# Patient Record
Sex: MALE | Race: WHITE | NOT HISPANIC OR LATINO | Employment: OTHER | URBAN - METROPOLITAN AREA
[De-identification: names, ages, dates, MRNs, and addresses within clinical notes are randomized per-mention and may not be internally consistent; named-entity substitution may affect disease eponyms.]

---

## 2019-10-16 ENCOUNTER — HOSPITAL ENCOUNTER (EMERGENCY)
Facility: CLINIC | Age: 69
Discharge: HOME | End: 2019-10-16
Attending: EMERGENCY MEDICINE | Admitting: EMERGENCY MEDICINE
Payer: MEDICARE

## 2019-10-16 ENCOUNTER — APPOINTMENT (OUTPATIENT)
Dept: CT IMAGING | Facility: CLINIC | Age: 69
End: 2019-10-16
Attending: EMERGENCY MEDICINE
Payer: MEDICARE

## 2019-10-16 VITALS
OXYGEN SATURATION: 98 % | SYSTOLIC BLOOD PRESSURE: 138 MMHG | TEMPERATURE: 98.3 F | HEART RATE: 70 BPM | BODY MASS INDEX: 32.62 KG/M2 | RESPIRATION RATE: 20 BRPM | WEIGHT: 220.24 LBS | HEIGHT: 69 IN | DIASTOLIC BLOOD PRESSURE: 78 MMHG

## 2019-10-16 DIAGNOSIS — R07.89 ATYPICAL CHEST PAIN: ICD-10-CM

## 2019-10-16 DIAGNOSIS — R10.13 ABDOMINAL PAIN, EPIGASTRIC: ICD-10-CM

## 2019-10-16 LAB
ALBUMIN SERPL-MCNC: 3.9 G/DL (ref 3.4–5)
ALBUMIN UR-MCNC: NEGATIVE MG/DL
ALP SERPL-CCNC: 96 U/L (ref 40–150)
ALT SERPL W P-5'-P-CCNC: 32 U/L (ref 0–70)
ANION GAP SERPL CALCULATED.3IONS-SCNC: 2 MMOL/L (ref 3–14)
APPEARANCE UR: CLEAR
AST SERPL W P-5'-P-CCNC: 40 U/L (ref 0–45)
BASOPHILS # BLD AUTO: 0.1 10E9/L (ref 0–0.2)
BASOPHILS NFR BLD AUTO: 1.4 %
BILIRUB DIRECT SERPL-MCNC: <0.1 MG/DL (ref 0–0.2)
BILIRUB SERPL-MCNC: 0.9 MG/DL (ref 0.2–1.3)
BILIRUB UR QL STRIP: NEGATIVE
BUN SERPL-MCNC: 20 MG/DL (ref 7–30)
CALCIUM SERPL-MCNC: 9.1 MG/DL (ref 8.5–10.1)
CAOX CRY #/AREA URNS HPF: ABNORMAL /HPF
CHLORIDE SERPL-SCNC: 105 MMOL/L (ref 94–109)
CO2 SERPL-SCNC: 30 MMOL/L (ref 20–32)
COLOR UR AUTO: YELLOW
CREAT SERPL-MCNC: 0.96 MG/DL (ref 0.66–1.25)
DIFFERENTIAL METHOD BLD: ABNORMAL
EOSINOPHIL # BLD AUTO: 0.2 10E9/L (ref 0–0.7)
EOSINOPHIL NFR BLD AUTO: 4.4 %
ERYTHROCYTE [DISTWIDTH] IN BLOOD BY AUTOMATED COUNT: 11.9 % (ref 10–15)
GFR SERPL CREATININE-BSD FRML MDRD: 79 ML/MIN/{1.73_M2}
GLUCOSE SERPL-MCNC: 145 MG/DL (ref 70–99)
GLUCOSE UR STRIP-MCNC: NEGATIVE MG/DL
HCT VFR BLD AUTO: 41.3 % (ref 40–53)
HGB BLD-MCNC: 13.8 G/DL (ref 13.3–17.7)
HGB UR QL STRIP: NEGATIVE
IMM GRANULOCYTES # BLD: 0 10E9/L (ref 0–0.4)
IMM GRANULOCYTES NFR BLD: 0.2 %
KETONES UR STRIP-MCNC: NEGATIVE MG/DL
LEUKOCYTE ESTERASE UR QL STRIP: NEGATIVE
LYMPHOCYTES # BLD AUTO: 2.3 10E9/L (ref 0.8–5.3)
LYMPHOCYTES NFR BLD AUTO: 53.3 %
MCH RBC QN AUTO: 30.4 PG (ref 26.5–33)
MCHC RBC AUTO-ENTMCNC: 33.4 G/DL (ref 31.5–36.5)
MCV RBC AUTO: 91 FL (ref 78–100)
MONOCYTES # BLD AUTO: 0.4 10E9/L (ref 0–1.3)
MONOCYTES NFR BLD AUTO: 10 %
MUCOUS THREADS #/AREA URNS LPF: PRESENT /LPF
NEUTROPHILS # BLD AUTO: 1.3 10E9/L (ref 1.6–8.3)
NEUTROPHILS NFR BLD AUTO: 30.7 %
NITRATE UR QL: NEGATIVE
NRBC # BLD AUTO: 0 10*3/UL
NRBC BLD AUTO-RTO: 0 /100
PH UR STRIP: 5.5 PH (ref 5–7)
PLATELET # BLD AUTO: 223 10E9/L (ref 150–450)
POTASSIUM SERPL-SCNC: 5 MMOL/L (ref 3.4–5.3)
PROT SERPL-MCNC: 8 G/DL (ref 6.8–8.8)
RBC # BLD AUTO: 4.54 10E12/L (ref 4.4–5.9)
RBC #/AREA URNS AUTO: 2 /HPF (ref 0–2)
SODIUM SERPL-SCNC: 137 MMOL/L (ref 133–144)
SOURCE: ABNORMAL
SP GR UR STRIP: 1.03 (ref 1–1.03)
SQUAMOUS #/AREA URNS AUTO: <1 /HPF (ref 0–1)
TROPONIN I SERPL-MCNC: <0.015 UG/L (ref 0–0.04)
UROBILINOGEN UR STRIP-MCNC: NORMAL MG/DL (ref 0–2)
WBC # BLD AUTO: 4.3 10E9/L (ref 4–11)
WBC #/AREA URNS AUTO: <1 /HPF (ref 0–5)

## 2019-10-16 PROCEDURE — 96361 HYDRATE IV INFUSION ADD-ON: CPT

## 2019-10-16 PROCEDURE — 81001 URINALYSIS AUTO W/SCOPE: CPT | Performed by: EMERGENCY MEDICINE

## 2019-10-16 PROCEDURE — 80048 BASIC METABOLIC PNL TOTAL CA: CPT | Performed by: EMERGENCY MEDICINE

## 2019-10-16 PROCEDURE — 25000128 H RX IP 250 OP 636: Performed by: EMERGENCY MEDICINE

## 2019-10-16 PROCEDURE — 99285 EMERGENCY DEPT VISIT HI MDM: CPT | Mod: 25

## 2019-10-16 PROCEDURE — 84484 ASSAY OF TROPONIN QUANT: CPT | Performed by: EMERGENCY MEDICINE

## 2019-10-16 PROCEDURE — 96360 HYDRATION IV INFUSION INIT: CPT

## 2019-10-16 PROCEDURE — 85025 COMPLETE CBC W/AUTO DIFF WBC: CPT | Performed by: EMERGENCY MEDICINE

## 2019-10-16 PROCEDURE — 74176 CT ABD & PELVIS W/O CONTRAST: CPT

## 2019-10-16 PROCEDURE — 80076 HEPATIC FUNCTION PANEL: CPT | Performed by: EMERGENCY MEDICINE

## 2019-10-16 PROCEDURE — 25000132 ZZH RX MED GY IP 250 OP 250 PS 637: Mod: GY | Performed by: EMERGENCY MEDICINE

## 2019-10-16 RX ORDER — IOPAMIDOL 755 MG/ML
500 INJECTION, SOLUTION INTRAVASCULAR ONCE
Status: DISCONTINUED | OUTPATIENT
Start: 2019-10-16 | End: 2019-10-16

## 2019-10-16 RX ORDER — IBUPROFEN 800 MG/1
800 TABLET, FILM COATED ORAL ONCE
Status: COMPLETED | OUTPATIENT
Start: 2019-10-16 | End: 2019-10-16

## 2019-10-16 RX ORDER — IBUPROFEN 800 MG/1
800 TABLET, FILM COATED ORAL EVERY 8 HOURS PRN
Qty: 15 TABLET | Refills: 0 | Status: ON HOLD | OUTPATIENT
Start: 2019-10-16 | End: 2022-06-19

## 2019-10-16 RX ADMIN — IBUPROFEN 800 MG: 800 TABLET ORAL at 16:52

## 2019-10-16 RX ADMIN — SODIUM CHLORIDE 1000 ML: 9 INJECTION, SOLUTION INTRAVENOUS at 14:18

## 2019-10-16 ASSESSMENT — ENCOUNTER SYMPTOMS
HEMATURIA: 0
DYSURIA: 0
DIAPHORESIS: 1
SHORTNESS OF BREATH: 0
HEADACHES: 1
WEAKNESS: 1
DIARRHEA: 0
ABDOMINAL PAIN: 1
CONSTIPATION: 0
ABDOMINAL DISTENTION: 1
FREQUENCY: 0
NAUSEA: 1

## 2019-10-16 ASSESSMENT — MIFFLIN-ST. JEOR: SCORE: 1744.38

## 2019-10-16 NOTE — DISCHARGE INSTRUCTIONS
Diagnosis: Nonspecific abdominal pain  Plan: Follow-up with your doctor to discuss your chest pain symptoms and Follow-up with [facility] as already recommended by your primary care provider.  [facility]    [address]    [place] [address]    Phone: [phone]    Return Precautions:   Worsening symptoms including chest pain, shortness of breath, abdominal pain, vomiting, or for any other concerns.     Please read the remainder of your discharge instructions for more information.     Discharge Instructions  Chest Pain    You have been seen today for chest pain or discomfort.  At this time, your provider has found no signs that your chest pain is due to a serious or life-threatening condition, (or you have declined more testing and/or admission to the hospital). However, sometimes there is a serious problem that does not show up right away. Your evaluation today may not be complete and you may need further testing and evaluation.     Generally, every Emergency Department visit should have a follow-up clinic visit with either a primary or a specialty clinic/provider. Please follow-up as instructed by your emergency provider today.  Return to the Emergency Department if:  Your chest pain changes, gets worse, starts to happen more often, or comes with less activity.  You are newly short of breath.  You get very weak or tired.  You pass out or faint.  You have any new symptoms, like fever, cough, numb legs, or you cough up blood.  You have anything else that worries you.    Until you follow-up with your regular provider, please do the following:  Take one aspirin daily unless you have an allergy or are told not to by your provider.  If a stress test appointment has been made, go to the appointment.  If you have questions, contact your regular provider.  Follow-up with your regular provider/clinic as directed; this is very important.    If you were given a prescription for  medicine here today, be sure to read all of the information (including the package insert) that comes with your prescription.  This will include important information about the medicine, its side effects, and any warnings that you need to know about.  The pharmacist who fills the prescription can provide more information and answer questions you may have about the medicine.  If you have questions or concerns that the pharmacist cannot address, please call or return to the Emergency Department.       Remember that you can always come back to the Emergency Department if you are not able to see your regular provider in the amount of time listed above, if you get any new symptoms, or if there is anything that worries you.  Discharge Instructions  Abdominal Pain    Abdominal pain (belly pain) can be caused by many things. Your evaluation today does not show the exact cause for your pain. Your provider today has decided that it is unlikely your pain is due to a life threatening problem, or a problem requiring surgery or hospital admission. Sometimes those problems cannot be found right away, so it is very important that you follow up as directed.  Sometimes only the changes which occur over time allow the cause of your pain to be found.    Generally, every Emergency Department visit should have a follow-up clinic visit with either a primary or a specialty clinic/provider. Please follow-up as instructed by your emergency provider today. With abdominal pain, we often recommend very close follow-up, such as the following day.    ADULTS:  Return to the Emergency Department right away if:    You get an oral temperature above 102oF or as directed by your provider.  You have blood in your stools. This may be bright red or appear as black, tarry stools.    You keep vomiting (throwing up) or cannot drink liquids.  You see blood when you vomit.   You cannot have a bowel movement or you cannot pass gas.  Your stomach gets bloated or  bigger.  Your skin or the whites of your eyes look yellow.  You faint.  You have bloody, frequent or painful urination (peeing).  You have new symptoms or anything that worries you.    CHILDREN:  Return to the Emergency Department right away if your child has any of the above-listed symptoms or the following:    Pushes your hand away or screams/cries when his/her belly is touched.  You notice your child is very fussy or weak.  Your child is very tired and is too tired to eat or drink.  Your child is dehydrated.  Signs of dehydration can be:  Significant change in the amount of wet diapers/urine.  Your infant or child starts to have dry mouth and lips, or no saliva (spit) or tears.    PREGNANT WOMEN:  Return to the Emergency Department right away if you have any of the above-listed symptoms or the following:    You have bleeding, leaking fluid or passing tissue from the vagina.  You have worse pain or cramping, or pain in your shoulder or back.  You have vomiting that will not stop.  You have a temperature of 100oF or more.  Your baby is not moving as much as usual.  You faint.  You get a bad headache with or without eye problems and abdominal pain.  You have a seizure.  You have unusual discharge from your vagina and abdominal pain.    Abdominal pain is pretty common during pregnancy.  Your pain may or may not be related to your pregnancy. You should follow-up closely with your OB provider so they can evaluate you and your baby.  Until you follow-up with your regular provider, do the following:     Avoid sex and do not put anything in your vagina.  Drink clear fluids.  Only take medications approved by your provider.    MORE INFORMATION:    Appendicitis:  A possible cause of abdominal pain in any person who still has their appendix is acute appendicitis. Appendicitis is often hard to diagnose.  Testing does not always rule out early appendicitis or other causes of abdominal pain. Close follow-up with your provider  "and re-evaluations may be needed to figure out the reason for your abdominal pain.    Follow-up:  It is very important that you make an appointment with your clinic and go to the appointment.  If you do not follow-up with your primary provider, it may result in missing an important development which could result in permanent injury or disability and/or lasting pain.  If there is any problem keeping your appointment, call your provider or return to the Emergency Department.    Medications:  Take your medications as directed by your provider today.  Before using over-the-counter medications, ask your provider and make sure to take the medications as directed.  If you have any questions about medications, ask your provider.    Diet:  Resume your normal diet as much as possible, but do not eat fried, fatty or spicy foods while you have pain.  Do not drink alcohol or have caffeine.  Do not smoke tobacco.    Probiotics: If you have been given an antibiotic, you may want to also take a probiotic pill or eat yogurt with live cultures. Probiotics have \"good bacteria\" to help your intestines stay healthy. Studies have shown that probiotics help prevent diarrhea (loose stools) and other intestine problems (including C. diff infection) when you take antibiotics. You can buy these without a prescription in the pharmacy section of the store.     If you were given a prescription for medicine here today, be sure to read all of the information (including the package insert) that comes with your prescription.  This will include important information about the medicine, its side effects, and any warnings that you need to know about.  The pharmacist who fills the prescription can provide more information and answer questions you may have about the medicine.  If you have questions or concerns that the pharmacist cannot address, please call or return to the Emergency Department.       Remember that you can always come back to the " Emergency Department if you are not able to see your regular provider in the amount of time listed above, if you get any new symptoms, or if there is anything that worries you.

## 2019-10-16 NOTE — ED PROVIDER NOTES
History     Chief Complaint:  Abdominal Pain & Nausea    HPI   Jean Paul Gordon is a 69 year old male, with a history of diverticulitis, who presents to the ED for evaluation of generalized abdominal pain and nausea. The patient reports he has been having intermittent abdominal pain and nausea in the morning for some time, estimated to be years. His pain usually subsides after 2-3 hours. He noticed abdominal bloating last week. The patient notes his symptoms worsened over the weekend. He had associated headache with subjective fevers and diaphoresis. He as well currently feels dehydrated and generally weak. The patient denies any dysuria, hematuria, urinary frequency, constipation, or diarrhea. Patient later endoses chest pain which he reports he gets once every 2 weeks, last experienced it yesterday morning.     Allergies:  Contrast dye     Medications:    Clonazepam  Lisinopril   Methadone    Past Medical History:    Anxiety   Myocarditis  Narcolepsy   Diverticulitis   HTN    Past Surgical History:    History reviewed. No pertinent surgical history.    Family History:    History reviewed. No pertinent family history.     Social History:  Smoking status: Never smoker    Alcohol use: No  Presents to ED alone    Marital Status:   [2]     Review of Systems   Constitutional: Positive for diaphoresis.   Respiratory: Negative for shortness of breath.    Cardiovascular: Positive for chest pain.   Gastrointestinal: Positive for abdominal distention, abdominal pain and nausea. Negative for constipation and diarrhea.   Genitourinary: Negative for dysuria, frequency and hematuria.   Neurological: Positive for weakness and headaches.   All other systems reviewed and are negative.    Physical Exam     Patient Vitals for the past 24 hrs:   BP Temp Temp src Pulse Heart Rate Resp SpO2 Height Weight   10/16/19 1445 (!) 140/76 -- -- 67 -- -- 97 % -- --   10/16/19 1155 (!) 141/84 98.3  F (36.8  C) Temporal -- 87 20 99 %  "1.753 m (5' 9\") 99.9 kg (220 lb 3.8 oz)     Physical Exam  Nursing note and vitals reviewed.  Constitutional: Cooperative.   HENT:   Mouth/Throat: Moist mucous membranes.   Eyes: EOMI, nonicteric sclera  Cardiovascular: Normal rate, regular rhythm, no murmurs, rubs, or gallops  Pulmonary/Chest: Effort normal and breath sounds normal. No respiratory distress. No wheezes. No rales.   Abdominal: Soft. Nontender, mild distension, no guarding or rigidity. BS present.   Musculoskeletal: Normal range of motion.   Neurological: Alert. Moves all extremities spontaneously.   Skin: Skin is warm and dry. No rash noted.   Psychiatric: Normal mood and affect.       Emergency Department Course     ECG (16:12:32):  Rate 64 bpm. CA interval 166. QRS duration 82. QT/QTc 452/466. P-R-T axes 34 -13 52. Normal sinus rhythm. Normal ECG. No significant change compared to EKG dated 11/2/15. Interpreted at 1614 by Jean Paul Gomez MD.    Imaging:  Radiographic findings were communicated with the patient who voiced understanding of the findings.    CT Abdomen Pelvis w/o Contrast  IMPRESSION:   1. No acute abnormality in the abdomen or pelvis. No cause for  abdominal pain and distention is identified.  2. Nonobstructing 0.2 cm stone in the lower pole of the right kidney.  Imaging independently reviewed and agree with radiologist interpretation.     Laboratory:  Laboratory findings were communicated with the patient who voiced understanding of the findings.    Troponin I (1257): <0.015    Hepatic panel: WNL     CBC: o/w WNL (WBC 4.3, HGB 13.8, )  BMP: Anion gap 2(L), Glucose 145(H), o/w WNL (Creatinine 0.96)     UA: Mucous present, Calcium oxalate few, o/w Negative     Interventions:  1418: NS 1L Bolus IV    Emergency Department Course:  1200: Patient provided a urine sample.     1257: IV inserted and blood drawn.    Past medical records, nursing notes, and vitals reviewed.  1329: I performed an exam of the patient and obtained " history, as documented above.    The patient was sent for an abdomen pelvis CT while in the emergency department, findings above.    1545: I rechecked the patient. Explained findings to patient. Patient reports having chest pain. Troponin ordered.     1612: EKG obtained.     I rechecked the patient. Explained findings to patient.    Findings and plan explained to the Patient. Patient discharged home with instructions regarding supportive care, medications, and reasons to return. The importance of close follow-up was reviewed. The patient was prescribed Ibuprofen.    Impression & Plan      Medical Decision Making:  Patient presents with multiple complaints, initially focusing on his upper abdominal pain that he has been experiencing for several years.  He reportedly trialed antacids for this without benefit.  This is been worse in the last week prompting presentation.  Patient states it feels like a prior episode of diverticulitis.  Given chronicity of pain, CT indicated to evaluate for malignancy, ascites, among many other etiologies.  CT fortunately negative.  Labs are overall unremarkable.  When I was discharging patient, he reported that he has been having chest pain also over a long period of time.  He has not discussed this with his doctor.  An EKG is nonischemic and troponin is negative.  Given the chronicity of his complaints, I believe this is sufficient to rule out ACS.  He may need a stress test, but I recommended that he discuss this with his primary care physician.  Concerning his abdominal pain, patient has been recommended to follow-up with gastroenterology in the past, but has yet to make an appointment.  I encouraged him to make this appointment as he may need endoscopy for further evaluation of his chronic abdominal pain.  Uncertain of the etiology of his complaints, but no emergent etiology is found.  He is safe/stable for discharge home.  All of his questions were answered.    Diagnosis:     ICD-10-CM   1. Abdominal pain, epigastric R10.13   2. Atypical chest pain R07.89     Disposition: Patient discharged to home     Discharge Medications:  New Prescriptions    IBUPROFEN (ADVIL/MOTRIN) 800 MG TABLET    Take 1 tablet (800 mg) by mouth every 8 hours as needed for moderate pain     Мария Centeno  10/16/2019   [facility] EMERGENCY DEPARTMENT    Scribe Disclosure:  I, Мария Centeno, am serving as a scribe at 1:29 PM on 10/16/2019 to document services personally performed by Jean Paul Gomez MD based on my observations and the provider's statements to me.        Jean Paul Gomez MD  10/16/19 1924

## 2019-10-16 NOTE — ED AVS SNAPSHOT
[facility] Emergency Department  [address]  [facility] [address]  Phone:  [phone]  Fax:  [phone]                                    Jean Paul Gordon   MRN: [id number]    Department:  [facility] Emergency Department   Date of Visit:  10/16/2019           After Visit Summary Signature Page    I have received my discharge instructions, and my questions have been answered. I have discussed any challenges I see with this plan with the nurse or doctor.    ..........................................................................................................................................  Patient/Patient Representative Signature      ..........................................................................................................................................  Patient Representative Print Name and Relationship to Patient    ..................................................               ................................................  Date                                   Time    ..........................................................................................................................................  Reviewed by Signature/Title    ...................................................              ..............................................  Date                                               Time          22EPIC Rev 08/18

## 2019-10-16 NOTE — ED TRIAGE NOTES
"Pt has generalized abdominal pain for \"a long time\".  He takes pain medication and pain goes away in 2-3 hours.  He has nausea and reports losing interest in activities and feels \"blah\".  The medication that he takes \"is not working anymore\" for narcolepsy.  He has generalized weakness.  "

## 2019-10-17 LAB — INTERPRETATION ECG - MUSE: NORMAL

## 2022-01-19 ENCOUNTER — NURSE TRIAGE (OUTPATIENT)
Dept: NURSING | Facility: CLINIC | Age: 72
End: 2022-01-19
Payer: MEDICARE

## 2022-01-19 NOTE — TELEPHONE ENCOUNTER
Caller state that he has been advised by his PCP to go to the ED   He is contacting  [facility] with questions regarding possible available treatment   Caller is informed to proceed to ED and  They will determine treatments available for his Covid sympoms   Caller understands and will proceed   Devi Lawton RN  FNA       Additional Information    Information only question and nurse able to answer    Protocols used: INFORMATION ONLY CALL - NO TRIAGE-A-OH

## 2022-01-20 ENCOUNTER — APPOINTMENT (OUTPATIENT)
Dept: ULTRASOUND IMAGING | Facility: CLINIC | Age: 72
End: 2022-01-20
Attending: PHYSICIAN ASSISTANT
Payer: COMMERCIAL

## 2022-01-20 ENCOUNTER — APPOINTMENT (OUTPATIENT)
Dept: GENERAL RADIOLOGY | Facility: CLINIC | Age: 72
End: 2022-01-20
Attending: PHYSICIAN ASSISTANT
Payer: COMMERCIAL

## 2022-01-20 ENCOUNTER — APPOINTMENT (OUTPATIENT)
Dept: NUCLEAR MEDICINE | Facility: CLINIC | Age: 72
End: 2022-01-20
Attending: PHYSICIAN ASSISTANT
Payer: COMMERCIAL

## 2022-01-20 ENCOUNTER — OFFICE VISIT (OUTPATIENT)
Dept: URGENT CARE | Facility: URGENT CARE | Age: 72
End: 2022-01-20
Payer: COMMERCIAL

## 2022-01-20 ENCOUNTER — HOSPITAL ENCOUNTER (EMERGENCY)
Facility: CLINIC | Age: 72
Discharge: HOME | End: 2022-01-20
Attending: PHYSICIAN ASSISTANT | Admitting: PHYSICIAN ASSISTANT
Payer: COMMERCIAL

## 2022-01-20 VITALS
TEMPERATURE: 98.2 F | SYSTOLIC BLOOD PRESSURE: 164 MMHG | DIASTOLIC BLOOD PRESSURE: 90 MMHG | RESPIRATION RATE: 16 BRPM | HEART RATE: 73 BPM | OXYGEN SATURATION: 95 %

## 2022-01-20 VITALS
OXYGEN SATURATION: 87 % | DIASTOLIC BLOOD PRESSURE: 100 MMHG | SYSTOLIC BLOOD PRESSURE: 152 MMHG | RESPIRATION RATE: 22 BRPM | TEMPERATURE: 98.9 F | HEART RATE: 89 BPM

## 2022-01-20 DIAGNOSIS — U07.1 PNEUMONIA DUE TO 2019 NOVEL CORONAVIRUS: ICD-10-CM

## 2022-01-20 DIAGNOSIS — U07.1 INFECTION DUE TO 2019 NOVEL CORONAVIRUS: Primary | ICD-10-CM

## 2022-01-20 DIAGNOSIS — R09.02 HYPOXIA: ICD-10-CM

## 2022-01-20 DIAGNOSIS — J12.82 PNEUMONIA DUE TO 2019 NOVEL CORONAVIRUS: ICD-10-CM

## 2022-01-20 LAB
ANION GAP SERPL CALCULATED.3IONS-SCNC: 6 MMOL/L (ref 3–14)
BASOPHILS # BLD AUTO: 0 10E3/UL (ref 0–0.2)
BASOPHILS NFR BLD AUTO: 0 %
BUN SERPL-MCNC: 19 MG/DL (ref 7–30)
CALCIUM SERPL-MCNC: 8.9 MG/DL (ref 8.5–10.1)
CHLORIDE BLD-SCNC: 105 MMOL/L (ref 94–109)
CO2 SERPL-SCNC: 27 MMOL/L (ref 20–32)
CREAT SERPL-MCNC: 0.7 MG/DL (ref 0.66–1.25)
D DIMER PPP FEU-MCNC: 12.81 UG/ML FEU (ref 0–0.5)
EOSINOPHIL # BLD AUTO: 0 10E3/UL (ref 0–0.7)
EOSINOPHIL NFR BLD AUTO: 0 %
ERYTHROCYTE [DISTWIDTH] IN BLOOD BY AUTOMATED COUNT: 11.7 % (ref 10–15)
GFR SERPL CREATININE-BSD FRML MDRD: >90 ML/MIN/1.73M2
GLUCOSE BLD-MCNC: 105 MG/DL (ref 70–99)
HCT VFR BLD AUTO: 36 % (ref 40–53)
HGB BLD-MCNC: 11.6 G/DL (ref 13.3–17.7)
IMM GRANULOCYTES # BLD: 0.3 10E3/UL
IMM GRANULOCYTES NFR BLD: 3 %
LYMPHOCYTES # BLD AUTO: 0.6 10E3/UL (ref 0.8–5.3)
LYMPHOCYTES NFR BLD AUTO: 7 %
MCH RBC QN AUTO: 29 PG (ref 26.5–33)
MCHC RBC AUTO-ENTMCNC: 32.2 G/DL (ref 31.5–36.5)
MCV RBC AUTO: 90 FL (ref 78–100)
MONOCYTES # BLD AUTO: 0.4 10E3/UL (ref 0–1.3)
MONOCYTES NFR BLD AUTO: 4 %
NEUTROPHILS # BLD AUTO: 7.2 10E3/UL (ref 1.6–8.3)
NEUTROPHILS NFR BLD AUTO: 86 %
NRBC # BLD AUTO: 0 10E3/UL
NRBC BLD AUTO-RTO: 0 /100
NT-PROBNP SERPL-MCNC: 735 PG/ML (ref 0–900)
PLATELET # BLD AUTO: 244 10E3/UL (ref 150–450)
POTASSIUM BLD-SCNC: 4 MMOL/L (ref 3.4–5.3)
RBC # BLD AUTO: 4 10E6/UL (ref 4.4–5.9)
SODIUM SERPL-SCNC: 138 MMOL/L (ref 133–144)
TROPONIN I SERPL HS-MCNC: 5 NG/L
WBC # BLD AUTO: 8.5 10E3/UL (ref 4–11)

## 2022-01-20 PROCEDURE — 36415 COLL VENOUS BLD VENIPUNCTURE: CPT | Performed by: PHYSICIAN ASSISTANT

## 2022-01-20 PROCEDURE — 78580 LUNG PERFUSION IMAGING: CPT

## 2022-01-20 PROCEDURE — 83880 ASSAY OF NATRIURETIC PEPTIDE: CPT | Performed by: PHYSICIAN ASSISTANT

## 2022-01-20 PROCEDURE — 93970 EXTREMITY STUDY: CPT

## 2022-01-20 PROCEDURE — 99285 EMERGENCY DEPT VISIT HI MDM: CPT | Mod: 25

## 2022-01-20 PROCEDURE — 84484 ASSAY OF TROPONIN QUANT: CPT | Performed by: EMERGENCY MEDICINE

## 2022-01-20 PROCEDURE — 71045 X-RAY EXAM CHEST 1 VIEW: CPT

## 2022-01-20 PROCEDURE — 99203 OFFICE O/P NEW LOW 30 MIN: CPT | Performed by: FAMILY MEDICINE

## 2022-01-20 PROCEDURE — 93005 ELECTROCARDIOGRAM TRACING: CPT

## 2022-01-20 PROCEDURE — 250N000011 HC RX IP 250 OP 636: Performed by: PHYSICIAN ASSISTANT

## 2022-01-20 PROCEDURE — 343N000001 HC RX 343: Performed by: EMERGENCY MEDICINE

## 2022-01-20 PROCEDURE — 80048 BASIC METABOLIC PNL TOTAL CA: CPT | Performed by: PHYSICIAN ASSISTANT

## 2022-01-20 PROCEDURE — A9540 TC99M MAA: HCPCS | Performed by: EMERGENCY MEDICINE

## 2022-01-20 PROCEDURE — 85379 FIBRIN DEGRADATION QUANT: CPT | Performed by: PHYSICIAN ASSISTANT

## 2022-01-20 PROCEDURE — 96374 THER/PROPH/DIAG INJ IV PUSH: CPT | Mod: 59

## 2022-01-20 PROCEDURE — 85025 COMPLETE CBC W/AUTO DIFF WBC: CPT | Performed by: EMERGENCY MEDICINE

## 2022-01-20 RX ORDER — DEXTROAMPHETAMINE SULFATE 10 MG/1
TABLET ORAL
COMMUNITY
Start: 2021-10-07

## 2022-01-20 RX ORDER — TESTOSTERONE 2 MG/D
PATCH TRANSDERMAL
Status: ON HOLD | COMMUNITY
Start: 2021-09-16 | End: 2022-06-19

## 2022-01-20 RX ORDER — HYDROCHLOROTHIAZIDE 12.5 MG/1
1 TABLET ORAL DAILY
COMMUNITY
Start: 2021-03-10

## 2022-01-20 RX ORDER — PREDNISONE 20 MG/1
TABLET ORAL
Status: ON HOLD | COMMUNITY
Start: 2022-01-19 | End: 2022-06-19

## 2022-01-20 RX ORDER — ALBUTEROL SULFATE 90 UG/1
1-2 AEROSOL, METERED RESPIRATORY (INHALATION)
Status: ON HOLD | COMMUNITY
Start: 2022-01-12 | End: 2022-06-19

## 2022-01-20 RX ORDER — DOXYCYCLINE HYCLATE 100 MG
TABLET ORAL
Status: ON HOLD | COMMUNITY
Start: 2022-01-12 | End: 2022-06-19

## 2022-01-20 RX ORDER — LORAZEPAM 2 MG/ML
0.5 INJECTION INTRAMUSCULAR ONCE
Status: COMPLETED | OUTPATIENT
Start: 2022-01-20 | End: 2022-01-20

## 2022-01-20 RX ORDER — ATORVASTATIN CALCIUM 10 MG/1
10 TABLET, FILM COATED ORAL DAILY
COMMUNITY
Start: 2021-09-16

## 2022-01-20 RX ORDER — LISINOPRIL 20 MG/1
20 TABLET ORAL DAILY
COMMUNITY
Start: 2021-09-16

## 2022-01-20 RX ADMIN — LORAZEPAM 0.5 MG: 2 INJECTION INTRAMUSCULAR; INTRAVENOUS at 19:38

## 2022-01-20 RX ADMIN — KIT FOR THE PREPARATION OF TECHNETIUM TC 99M ALBUMIN AGGREGATED 3.4 MILLICURIE: 2.5 INJECTION, POWDER, FOR SOLUTION INTRAVENOUS at 20:15

## 2022-01-20 ASSESSMENT — ENCOUNTER SYMPTOMS
SHORTNESS OF BREATH: 1
COUGH: 1
FEVER: 1

## 2022-01-20 NOTE — ED TRIAGE NOTES
Patient presents from urgent care COVID+ with concerns for hypoxia and rule out PE. Tested COVID positive 1/8/22. Has been treated at home with some home 02, nebs, prednisone and doxycycline. Medications helped some. Went to urgent care today due to SOB and was sent here due to a oxygen reading of 87%. In triage, patient 97% while speaking.

## 2022-01-20 NOTE — ED NOTES
"Patient observed to be walking around ER lobby talking loudly on cell phone. Pt not displaying signs of shortness of breath. Pt maintaining steady gait while walking and talking. Pt overheard to mention many president's names and stated numerous times \"If so and so was here they would've given the meds to everyone\".   "

## 2022-01-20 NOTE — PROGRESS NOTES
SUBJECTIVE:   Jean Paul Gordon is a 71 year old male presenting with a chief complaint of SOB, COVID infection.    No know positive COVID exposure, patient reports that has completed COVID vaccinations and also received his booster already.    Had cold symptoms and fatigue initially 1/7, went to [facility] on 1/8 - diagnosed with COVID infection.  Per patient, was given prednisone and doxycycline, nebulizer and ended up getting Oxygen treatment delivered to his home  Over this week has been progressively worsening, declined ER evaluation when contacted PCP.    Patient reports that a PA came out to his home yesterday to evaluate and told patient that has slight wheeze.  Patient reports that is using oxygen overnight and will get level up, did comment that was told that 91-92 was still okay by PA.    Here today in UC and requesting new treatment that he has heard on the news for COVID infection as he does not want to go to ER.    Past Medical History:   Diagnosis Date     Anxiety state, unspecified      Hypertension      Myocarditis (H)      Narcolepsy      Current Outpatient Medications   Medication Sig Dispense Refill     albuterol (PROAIR HFA/PROVENTIL HFA/VENTOLIN HFA) 108 (90 Base) MCG/ACT inhaler Inhale 1-2 puffs into the lungs       atorvastatin (LIPITOR) 10 MG tablet Take 10 mg by mouth daily       dextroamphetamine (DEXTROSTAT) 10 MG tablet Take one tablet Four times a day       hydrochlorothiazide (HYDRODIURIL) 12.5 MG tablet Take 1 tablet by mouth daily       lisinopril (ZESTRIL) 20 MG tablet Take 20 mg by mouth       NONFORMULARY oxygen-air delivery systems (HOME OXYGEN)       testosterone (ANDRODERM) 2 MG/24HR 24 hr patch Apply patch daily to clean skin on back, abdomen, arms, thighs. Not to genitals. Remove old patch before applying new on       vitamin D3 (CHOLECALCIFEROL) 1.25 MG (38738 UT) capsule Take 50,000 Units by mouth       ClonazePAM (KLONOPIN PO)        doxycycline hyclate (VIBRA-TABS) 100  "MG tablet        ibuprofen (ADVIL/MOTRIN) 800 MG tablet Take 1 tablet (800 mg) by mouth every 8 hours as needed for moderate pain 15 tablet 0     LISINOPRIL PO        METHADONE HCL PO        predniSONE (DELTASONE) 20 MG tablet        UNABLE TO FIND \"Dexastat\"       Social History     Tobacco Use     Smoking status: Never Smoker     Smokeless tobacco: Not on file   Substance Use Topics     Alcohol use: No       ROS:  Review of systems negative except as stated above.    OBJECTIVE:  BP (!) 152/100   Pulse 89   Temp 98.9  F (37.2  C)   Resp 22   SpO2 (!) 87%   GENERAL APPEARANCE: healthy, alert, fatigue  EYES: EOMI,  PERRL, conjunctiva clear  RESP: lungs coarse, no crackles or wheezes, dyspneic  PSYCH: mentation appears normal and anxious    ASSESSMENT/PLAN:(U07.1) Infection due to 2019 novel coronavirus  (primary encounter diagnosis)  Plan: to ER    (R09.02) Hypoxia  Plan: to ER  '    Oxygen via NC - 2L given with improvement of oxygen to 99%.    Patient with COVID infection, progressively worsening symptoms and with current hypoxia and SOB, reviewed that is critical that needs further evaluation in ER.  Unclear in regards to his understanding of oxygen use at home but discussed that with hypoxia that further tests to assess for possible PE vs worsening COVID vs cardiac etiology is needed at this time.  Urged ER evaluation and patient did finally relent, wife will transport via private car to [facility] ER, ER notified    Yunior Ramírez MD  January 20, 2022 3:37 PM          "

## 2022-01-20 NOTE — ED PROVIDER NOTES
History   Chief Complaint:  Covid Concern       The history is provided by the patient.      Jean Paul Gordon is a 71 year old male with history of hypertension and myocarditis who presents with a COVID-19 concern. The patient tested positive for COVID-19 on 1/8/22 and has persisting symptoms of a cough, shortness of breath, and fatigue. He was referred to the ED from urgent care after they measured his oxygen levels to be 87%. In triage his oxygen was measured to be 97% while speaking. He reports self-treating at home with home oxygen, nebulizer, prednisone, and doxycycline. He states he is curious about new COVID-19 antivirals and malaria medicine. He states that the antibiotic he got in urgent care did not help his symptoms.  He notes some aches in his body including his legs.  He denies chest pain.  He does not necessarily feel that his shortness of breath is getting worse but it is not getting any better.  He states he is vaccinated.  He is not having any more fevers.  He notes no leg swelling or weight changes.    Review of Systems   Constitutional: Positive for fever.   Respiratory: Positive for cough and shortness of breath.    All other systems reviewed and are negative.      Allergies:  Methylphenidate  Armodafinil  Contrast dye    Medications:  Albuterol  Lipitor  Klonopin  Dextrostat  Vibra-tabs  Hydrodiuril  Zestril  Lisinopril  Methadone  Deltasone  Androderm  Home oxygen    Past Medical History:     Anxiety  Hypertension  Myocarditis  Narcolepsy  Opioid dependence  Hypogonadism  Recurrent erosion of cornea  Osteoarthritis  TMJ arthritis  Episodic cluster headache    Family History:    Father: bladder cancer, diabetes  Mother: bladder cancer    Social History:  Presents unaccompanied  PCP: Jo Sahni    Physical Exam     Patient Vitals for the past 24 hrs:   BP Temp Temp src Pulse Resp SpO2   01/20/22 1901 -- -- -- -- -- 92 %   01/20/22 1840 (!) 161/106 -- -- 76 -- 98 %   01/20/22 1839 --  -- -- -- -- 98 %   01/20/22 1824 -- -- -- -- -- 96 %   01/20/22 1823 -- -- -- -- -- 96 %   01/20/22 1822 -- -- -- -- -- 96 %   01/20/22 1820 (!) 159/94 -- -- -- -- 97 %   01/20/22 1819 (!) 159/94 -- -- 69 -- 97 %   01/20/22 1800 -- -- -- -- -- 97 %   01/20/22 1554 (!) 160/91 98.2  F (36.8  C) Oral 89 20 98 %       Physical Exam  General: Awake, alert, pleasant, non-toxic. Anxious appearing.  Head:  Scalp is NC/AT  Eyes:  Conjunctiva normal, PERRL  ENT:  The external nose and ears are normal.   Neck:  Normal range of motion without rigidity.  CV:  Regular rate and rhythm    No pathologic murmur, rubs, or gallops.  Resp:  Bilateral crackles.  No wheezes.    Non-labored, no retractions or accessory muscle use  Abdomen: Abdomen is soft, no distension, no tenderness, no masses. No CVA tenderness.  MS:  No lower extremity edema or asymmetric calf swelling.     No midline cervical, thoracic, or lumbar tenderness  Skin:  Warm and dry, No rash or lesions noted. 2+ peripheral pulses in all extremities  Neuro: Alert and oriented x3.  GCS 15 No gross motor deficits.  No facial asymmetry.  Psych: Awake. Alert. Normal affect. Appropriate interactions.    Emergency Department Course   ECG  ECG obtained at 1806, ECG read at 1809  Normal sinus rhythm. Moderate voltage criteria for LVH, may be normal variant. Prolonged QT. Abnormal ECG.   No significant change as compared to prior, dated 10/16/19.  Rate 70 bpm. DE interval 140 ms. QRS duration 86 ms. QT/QTc 450/486 ms. P-R-T axes 27 -9 22.     Imaging:  XR Chest Port 1 View   Final Result   IMPRESSION: Single AP view of the chest was obtained. Mild enlargement of the cardiac silhouette. Bilateral basilar and subpleural predominant patchy pulmonary opacities, worrisome for infection including atypical infection like COVID 19. No significant    pleural effusion or pneumothorax.      Lung vent and perf (NM)    (Results Pending)   US Lower Extremity Venous Duplex Bilateral    (Results  Pending)     Report per radiology    Laboratory:  Labs Ordered and Resulted from Time of ED Arrival to Time of ED Departure   BASIC METABOLIC PANEL - Abnormal       Result Value    Sodium 138      Potassium 4.0      Chloride 105      Carbon Dioxide (CO2) 27      Anion Gap 6      Urea Nitrogen 19      Creatinine 0.70      Calcium 8.9      Glucose 105 (*)     GFR Estimate >90     D DIMER QUANTITATIVE - Abnormal    D-Dimer Quantitative 12.81 (*)    CBC WITH PLATELETS AND DIFFERENTIAL - Abnormal    WBC Count 8.5      RBC Count 4.00 (*)     Hemoglobin 11.6 (*)     Hematocrit 36.0 (*)     MCV 90      MCH 29.0      MCHC 32.2      RDW 11.7      Platelet Count 244      % Neutrophils 86      % Lymphocytes 7      % Monocytes 4      % Eosinophils 0      % Basophils 0      % Immature Granulocytes 3      NRBCs per 100 WBC 0      Absolute Neutrophils 7.2      Absolute Lymphocytes 0.6 (*)     Absolute Monocytes 0.4      Absolute Eosinophils 0.0      Absolute Basophils 0.0      Absolute Immature Granulocytes 0.3      Absolute NRBCs 0.0     NT PROBNP INPATIENT - Normal    N terminal Pro BNP Inpatient 735     TROPONIN I      Emergency Department Course:    Reviewed:  I reviewed nursing notes, vitals, past medical history and Care Everywhere    Assessments:   I obtained history and examined the patient as noted above.    I rechecked the patient and explained findings.     Disposition:  Care of the patient was transferred to my colleague Dr. Basurto pending nuclear lung profusion scan.     Impression & Plan     Medical Decision Makin-year-old male diagnosed with COVID on  who presents with persistent shortness of breath and reported hypoxia at urgent care.  He was referred here for further evaluation for pulmonary embolism and possible admission.  Here his oxygen saturations have been normal.  He is not in respiratory distress.  His x-ray shows findings consistent with COVID-pneumonia no evidence to suggest coexisting  bacterial pneumonia.  He is afebrile with normal white blood cell count.  His EKG shows mildly prolonged QT with LVH but no acute ischemic changes and he is not having chest pain and I think ACS is unlikely.  BNP is not significantly elevated and no evidence of CHF on exam or imaging.  Troponin pending.  D-dimer was markedly elevated however unfortunately he has a contrast allergy.  VQ scan and bilateral lower extremity ultrasound ordered and pending.  He is not a candidate for COVID oral antivirals or monoclonal antibodies given that his symptoms have been going on for 12 days already.  He was signed out to Dr. Basurto  pending remaining test results.    Diagnosis:    ICD-10-CM    1. Pneumonia due to 2019 novel coronavirus  U07.1     J12.82        Discharge Medications:  New Prescriptions    No medications on file       Scribe Disclosure:  I, Danya Echeverria, am serving as a scribe at 5:53 PM on 1/20/2022 to document services personally performed by Figueroa Lipscomb, * based on my observations and the provider's statements to me.            Figueroa Lipscomb PA-C  01/20/22 1943       Figueroa Lipscomb PA-C  01/20/22 1946

## 2022-01-21 LAB
ATRIAL RATE - MUSE: 70 BPM
DIASTOLIC BLOOD PRESSURE - MUSE: NORMAL MMHG
INTERPRETATION ECG - MUSE: NORMAL
P AXIS - MUSE: 27 DEGREES
PR INTERVAL - MUSE: 140 MS
QRS DURATION - MUSE: 86 MS
QT - MUSE: 450 MS
QTC - MUSE: 486 MS
R AXIS - MUSE: -9 DEGREES
SYSTOLIC BLOOD PRESSURE - MUSE: NORMAL MMHG
T AXIS - MUSE: 22 DEGREES
VENTRICULAR RATE- MUSE: 70 BPM

## 2022-01-21 NOTE — ED PROVIDER NOTES
Emergency Department Attending Supervision Note  1/20/2022  8:47 PM      I evaluated this patient in conjunction with Figueroa RUIZ      Briefly, the patient presented with shortness of breath active COVID infection, too far out for Paxlovid or any other intervention at this point. Had questionable hypoxia earlier today, we have not documented any hypoxia during his time in the ED. He is breathing comfortably not requiring any supplemental oxygen. Had a very elevated D-dimer so initiated work-up for pulmonary embolism.      On my exam,       Gen: Resting comfortably   Eyes: Normal conjunctiva, No discharge  CV: regular rate  Resp: speaking in full sentences without any resp distress  Skin: warm dry well perfused  Neuro: Alert, no gross motor or sensory deficits,   Psych: pleasant, affect appropriate        Results:  Labs Ordered and Resulted from Time of ED Arrival to Time of ED Departure   BASIC METABOLIC PANEL - Abnormal       Result Value    Sodium 138      Potassium 4.0      Chloride 105      Carbon Dioxide (CO2) 27      Anion Gap 6      Urea Nitrogen 19      Creatinine 0.70      Calcium 8.9      Glucose 105 (*)     GFR Estimate >90     D DIMER QUANTITATIVE - Abnormal    D-Dimer Quantitative 12.81 (*)    CBC WITH PLATELETS AND DIFFERENTIAL - Abnormal    WBC Count 8.5      RBC Count 4.00 (*)     Hemoglobin 11.6 (*)     Hematocrit 36.0 (*)     MCV 90      MCH 29.0      MCHC 32.2      RDW 11.7      Platelet Count 244      % Neutrophils 86      % Lymphocytes 7      % Monocytes 4      % Eosinophils 0      % Basophils 0      % Immature Granulocytes 3      NRBCs per 100 WBC 0      Absolute Neutrophils 7.2      Absolute Lymphocytes 0.6 (*)     Absolute Monocytes 0.4      Absolute Eosinophils 0.0      Absolute Basophils 0.0      Absolute Immature Granulocytes 0.3      Absolute NRBCs 0.0     NT PROBNP INPATIENT - Normal    N terminal Pro BNP Inpatient 735     TROPONIN I - Normal    Troponin I High Sensitivity 5          NM Lung Scan Perfusion Particulate   Final Result   IMPRESSION:    Nondiagnostic with regards to pulmonary emboli      US Lower Extremity Venous Duplex Bilateral   Final Result   IMPRESSION:   1.  No deep venous thrombosis in the bilateral lower extremities.      XR Chest Port 1 View   Final Result   IMPRESSION: Single AP view of the chest was obtained. Mild enlargement of the cardiac silhouette. Bilateral basilar and subpleural predominant patchy pulmonary opacities, worrisome for infection including atypical infection like COVID 19. No significant    pleural effusion or pneumothorax.          Medications   LORazepam (ATIVAN) injection 0.5 mg (0.5 mg Intravenous Given 1/20/22 1938)   technetium pertechnetate with albumin (Tc99m MAA) radioisotope injection 3 millicurie (3.4 millicuries Intravenous Given 1/20/22 2015)       ED course:    My impression is patient has active COVID-19 infection. We will m  amarilis sure he has an oximeter at home to continue to monitor his symptoms. He has not been hypoxic in the ED not quiring oxygen ambulatory on his own. He is negative for bilateral lower extremity DVT, VQ scan not strongly suggestive of pulmonary embolism. For these reasons I think outpatient management is appropriate. Patient does have oximeter and so he will continue to monitor his O2 sats, if he has any other concerned about them being low I encouraged him to return for recheck at any time. He is comfortable with plan.        Diagnosis    ICD-10-CM    1. Pneumonia due to 2019 novel coronavirus  U07.1     J12.82          Danial Basurto,*        Danial Basurto MD  01/20/22 2050

## 2022-06-18 ENCOUNTER — HOSPITAL ENCOUNTER (OUTPATIENT)
Facility: CLINIC | Age: 72
Setting detail: OBSERVATION
Discharge: HOME | End: 2022-06-19
Attending: EMERGENCY MEDICINE | Admitting: STUDENT IN AN ORGANIZED HEALTH CARE EDUCATION/TRAINING PROGRAM
Payer: MEDICARE

## 2022-06-18 DIAGNOSIS — N17.9 AKI (ACUTE KIDNEY INJURY) (H): ICD-10-CM

## 2022-06-18 DIAGNOSIS — N20.0 NEPHROLITHIASIS: Primary | ICD-10-CM

## 2022-06-18 DIAGNOSIS — N23 RENAL COLIC: ICD-10-CM

## 2022-06-18 PROCEDURE — G0378 HOSPITAL OBSERVATION PER HR: HCPCS

## 2022-06-18 PROCEDURE — 99285 EMERGENCY DEPT VISIT HI MDM: CPT | Mod: 25

## 2022-06-18 PROCEDURE — 96374 THER/PROPH/DIAG INJ IV PUSH: CPT

## 2022-06-18 PROCEDURE — 99220 PR INITIAL OBSERVATION CARE,LEVEL III: CPT | Performed by: STUDENT IN AN ORGANIZED HEALTH CARE EDUCATION/TRAINING PROGRAM

## 2022-06-18 PROCEDURE — 96376 TX/PRO/DX INJ SAME DRUG ADON: CPT

## 2022-06-18 PROCEDURE — 250N000013 HC RX MED GY IP 250 OP 250 PS 637: Performed by: EMERGENCY MEDICINE

## 2022-06-18 PROCEDURE — 250N000011 HC RX IP 250 OP 636: Performed by: EMERGENCY MEDICINE

## 2022-06-18 RX ORDER — SODIUM CHLORIDE 9 MG/ML
INJECTION, SOLUTION INTRAVENOUS CONTINUOUS
Status: DISCONTINUED | OUTPATIENT
Start: 2022-06-18 | End: 2022-06-20 | Stop reason: HOSPADM

## 2022-06-18 RX ORDER — HYDROMORPHONE HYDROCHLORIDE 1 MG/ML
0.5 INJECTION, SOLUTION INTRAMUSCULAR; INTRAVENOUS; SUBCUTANEOUS
Status: DISCONTINUED | OUTPATIENT
Start: 2022-06-18 | End: 2022-06-19

## 2022-06-18 RX ORDER — TAMSULOSIN HYDROCHLORIDE 0.4 MG/1
0.4 CAPSULE ORAL ONCE
Status: COMPLETED | OUTPATIENT
Start: 2022-06-18 | End: 2022-06-18

## 2022-06-18 RX ADMIN — TAMSULOSIN HYDROCHLORIDE 0.4 MG: 0.4 CAPSULE ORAL at 22:54

## 2022-06-18 RX ADMIN — HYDROMORPHONE HYDROCHLORIDE 0.5 MG: 1 INJECTION, SOLUTION INTRAMUSCULAR; INTRAVENOUS; SUBCUTANEOUS at 22:56

## 2022-06-18 RX ADMIN — HYDROMORPHONE HYDROCHLORIDE 0.5 MG: 1 INJECTION, SOLUTION INTRAMUSCULAR; INTRAVENOUS; SUBCUTANEOUS at 23:55

## 2022-06-18 ASSESSMENT — ENCOUNTER SYMPTOMS
FEVER: 0
VOMITING: 0
DYSURIA: 0
FLANK PAIN: 1

## 2022-06-19 ENCOUNTER — ANESTHESIA EVENT (OUTPATIENT)
Dept: SURGERY | Facility: CLINIC | Age: 72
End: 2022-06-19
Payer: MEDICARE

## 2022-06-19 ENCOUNTER — ANESTHESIA (OUTPATIENT)
Dept: SURGERY | Facility: CLINIC | Age: 72
End: 2022-06-19
Payer: MEDICARE

## 2022-06-19 ENCOUNTER — APPOINTMENT (OUTPATIENT)
Dept: GENERAL RADIOLOGY | Facility: CLINIC | Age: 72
End: 2022-06-19
Attending: UROLOGY
Payer: MEDICARE

## 2022-06-19 VITALS
DIASTOLIC BLOOD PRESSURE: 80 MMHG | RESPIRATION RATE: 14 BRPM | BODY MASS INDEX: 30.11 KG/M2 | HEIGHT: 70 IN | SYSTOLIC BLOOD PRESSURE: 148 MMHG | TEMPERATURE: 98.3 F | WEIGHT: 210.3 LBS | OXYGEN SATURATION: 95 % | HEART RATE: 65 BPM

## 2022-06-19 DIAGNOSIS — N20.1 URETERAL STONE: Primary | ICD-10-CM

## 2022-06-19 LAB
ANION GAP SERPL CALCULATED.3IONS-SCNC: 3 MMOL/L (ref 3–14)
BUN SERPL-MCNC: 23 MG/DL (ref 7–30)
CALCIUM SERPL-MCNC: 9 MG/DL (ref 8.5–10.1)
CHLORIDE BLD-SCNC: 108 MMOL/L (ref 94–109)
CO2 SERPL-SCNC: 27 MMOL/L (ref 20–32)
CREAT SERPL-MCNC: 1.59 MG/DL (ref 0.66–1.25)
ERYTHROCYTE [DISTWIDTH] IN BLOOD BY AUTOMATED COUNT: 12.7 % (ref 10–15)
GFR SERPL CREATININE-BSD FRML MDRD: 45 ML/MIN/1.73M2
GLUCOSE BLD-MCNC: 84 MG/DL (ref 70–99)
HCT VFR BLD AUTO: 36.7 % (ref 40–53)
HGB BLD-MCNC: 11.8 G/DL (ref 13.3–17.7)
MCH RBC QN AUTO: 28.8 PG (ref 26.5–33)
MCHC RBC AUTO-ENTMCNC: 32.2 G/DL (ref 31.5–36.5)
MCV RBC AUTO: 90 FL (ref 78–100)
PLATELET # BLD AUTO: 177 10E3/UL (ref 150–450)
POTASSIUM BLD-SCNC: 4.1 MMOL/L (ref 3.4–5.3)
RBC # BLD AUTO: 4.1 10E6/UL (ref 4.4–5.9)
SODIUM SERPL-SCNC: 138 MMOL/L (ref 133–144)
WBC # BLD AUTO: 5.7 10E3/UL (ref 4–11)

## 2022-06-19 PROCEDURE — 85027 COMPLETE CBC AUTOMATED: CPT | Performed by: STUDENT IN AN ORGANIZED HEALTH CARE EDUCATION/TRAINING PROGRAM

## 2022-06-19 PROCEDURE — 250N000013 HC RX MED GY IP 250 OP 250 PS 637: Performed by: STUDENT IN AN ORGANIZED HEALTH CARE EDUCATION/TRAINING PROGRAM

## 2022-06-19 PROCEDURE — 999N000179 XR SURGERY CARM FLUORO LESS THAN 5 MIN W STILLS: Mod: TC

## 2022-06-19 PROCEDURE — 250N000009 HC RX 250: Performed by: UROLOGY

## 2022-06-19 PROCEDURE — 250N000013 HC RX MED GY IP 250 OP 250 PS 637: Performed by: PHYSICIAN ASSISTANT

## 2022-06-19 PROCEDURE — 360N000082 HC SURGERY LEVEL 2 W/ FLUORO, PER MIN: Performed by: UROLOGY

## 2022-06-19 PROCEDURE — G0378 HOSPITAL OBSERVATION PER HR: HCPCS

## 2022-06-19 PROCEDURE — C2617 STENT, NON-COR, TEM W/O DEL: HCPCS | Performed by: UROLOGY

## 2022-06-19 PROCEDURE — 96361 HYDRATE IV INFUSION ADD-ON: CPT

## 2022-06-19 PROCEDURE — 99226 PR SUBSEQUENT OBSERVATION CARE,LEVEL III: CPT | Performed by: PHYSICIAN ASSISTANT

## 2022-06-19 PROCEDURE — 250N000009 HC RX 250: Performed by: NURSE ANESTHETIST, CERTIFIED REGISTERED

## 2022-06-19 PROCEDURE — 370N000017 HC ANESTHESIA TECHNICAL FEE, PER MIN: Performed by: UROLOGY

## 2022-06-19 PROCEDURE — 52332 CYSTOSCOPY AND TREATMENT: CPT | Mod: LT | Performed by: UROLOGY

## 2022-06-19 PROCEDURE — 250N000011 HC RX IP 250 OP 636: Performed by: NURSE ANESTHETIST, CERTIFIED REGISTERED

## 2022-06-19 PROCEDURE — 272N000001 HC OR GENERAL SUPPLY STERILE: Performed by: UROLOGY

## 2022-06-19 PROCEDURE — 250N000011 HC RX IP 250 OP 636: Performed by: PHYSICIAN ASSISTANT

## 2022-06-19 PROCEDURE — 258N000003 HC RX IP 258 OP 636: Performed by: STUDENT IN AN ORGANIZED HEALTH CARE EDUCATION/TRAINING PROGRAM

## 2022-06-19 PROCEDURE — 258N000003 HC RX IP 258 OP 636: Performed by: NURSE ANESTHETIST, CERTIFIED REGISTERED

## 2022-06-19 PROCEDURE — 710N000009 HC RECOVERY PHASE 1, LEVEL 1, PER MIN: Performed by: UROLOGY

## 2022-06-19 PROCEDURE — 74420 UROGRAPHY RTRGR +-KUB: CPT | Mod: 26 | Performed by: UROLOGY

## 2022-06-19 PROCEDURE — 710N000012 HC RECOVERY PHASE 2, PER MINUTE: Performed by: UROLOGY

## 2022-06-19 PROCEDURE — 87102 FUNGUS ISOLATION CULTURE: CPT | Performed by: UROLOGY

## 2022-06-19 PROCEDURE — 80048 BASIC METABOLIC PNL TOTAL CA: CPT | Performed by: STUDENT IN AN ORGANIZED HEALTH CARE EDUCATION/TRAINING PROGRAM

## 2022-06-19 PROCEDURE — 87086 URINE CULTURE/COLONY COUNT: CPT | Mod: 59 | Performed by: UROLOGY

## 2022-06-19 PROCEDURE — 96375 TX/PRO/DX INJ NEW DRUG ADDON: CPT

## 2022-06-19 PROCEDURE — 999N000141 HC STATISTIC PRE-PROCEDURE NURSING ASSESSMENT: Performed by: UROLOGY

## 2022-06-19 PROCEDURE — 258N000003 HC RX IP 258 OP 636: Performed by: ANESTHESIOLOGY

## 2022-06-19 PROCEDURE — C1769 GUIDE WIRE: HCPCS | Performed by: UROLOGY

## 2022-06-19 PROCEDURE — 255N000002 HC RX 255 OP 636: Performed by: UROLOGY

## 2022-06-19 PROCEDURE — 87086 URINE CULTURE/COLONY COUNT: CPT | Performed by: EMERGENCY MEDICINE

## 2022-06-19 PROCEDURE — 250N000011 HC RX IP 250 OP 636: Performed by: STUDENT IN AN ORGANIZED HEALTH CARE EDUCATION/TRAINING PROGRAM

## 2022-06-19 PROCEDURE — 96376 TX/PRO/DX INJ SAME DRUG ADON: CPT

## 2022-06-19 PROCEDURE — 36415 COLL VENOUS BLD VENIPUNCTURE: CPT | Performed by: STUDENT IN AN ORGANIZED HEALTH CARE EDUCATION/TRAINING PROGRAM

## 2022-06-19 DEVICE — STENT URETERAL POLARIS ULTRA 6FRX26CM M0061921330
Type: IMPLANTABLE DEVICE | Site: URETER | Status: NON-FUNCTIONAL
Removed: 2022-07-07

## 2022-06-19 RX ORDER — SODIUM CHLORIDE, SODIUM LACTATE, POTASSIUM CHLORIDE, CALCIUM CHLORIDE 600; 310; 30; 20 MG/100ML; MG/100ML; MG/100ML; MG/100ML
INJECTION, SOLUTION INTRAVENOUS CONTINUOUS
Status: DISCONTINUED | OUTPATIENT
Start: 2022-06-19 | End: 2022-06-19 | Stop reason: HOSPADM

## 2022-06-19 RX ORDER — LIDOCAINE HYDROCHLORIDE 10 MG/ML
INJECTION, SOLUTION INFILTRATION; PERINEURAL PRN
Status: DISCONTINUED | OUTPATIENT
Start: 2022-06-19 | End: 2022-06-19

## 2022-06-19 RX ORDER — CEFTRIAXONE 1 G/1
1 INJECTION, POWDER, FOR SOLUTION INTRAMUSCULAR; INTRAVENOUS EVERY 24 HOURS
Status: DISCONTINUED | OUTPATIENT
Start: 2022-06-19 | End: 2022-06-20 | Stop reason: HOSPADM

## 2022-06-19 RX ORDER — ONDANSETRON 2 MG/ML
4 INJECTION INTRAMUSCULAR; INTRAVENOUS EVERY 6 HOURS PRN
Status: DISCONTINUED | OUTPATIENT
Start: 2022-06-19 | End: 2022-06-20 | Stop reason: HOSPADM

## 2022-06-19 RX ORDER — DEXTROAMPHETAMINE SULFATE 10 MG/1
10 TABLET ORAL 4 TIMES DAILY
Status: DISCONTINUED | OUTPATIENT
Start: 2022-06-19 | End: 2022-06-19

## 2022-06-19 RX ORDER — FENTANYL CITRATE 50 UG/ML
25 INJECTION, SOLUTION INTRAMUSCULAR; INTRAVENOUS EVERY 5 MIN PRN
Status: DISCONTINUED | OUTPATIENT
Start: 2022-06-19 | End: 2022-06-19 | Stop reason: HOSPADM

## 2022-06-19 RX ORDER — ONDANSETRON 4 MG/1
4 TABLET, ORALLY DISINTEGRATING ORAL EVERY 30 MIN PRN
Status: DISCONTINUED | OUTPATIENT
Start: 2022-06-19 | End: 2022-06-19 | Stop reason: HOSPADM

## 2022-06-19 RX ORDER — HYDROMORPHONE HYDROCHLORIDE 1 MG/ML
0.5 INJECTION, SOLUTION INTRAMUSCULAR; INTRAVENOUS; SUBCUTANEOUS
Status: DISCONTINUED | OUTPATIENT
Start: 2022-06-19 | End: 2022-06-20 | Stop reason: HOSPADM

## 2022-06-19 RX ORDER — ACETAMINOPHEN 650 MG/1
650 SUPPOSITORY RECTAL EVERY 6 HOURS PRN
Status: DISCONTINUED | OUTPATIENT
Start: 2022-06-19 | End: 2022-06-20 | Stop reason: HOSPADM

## 2022-06-19 RX ORDER — PROCHLORPERAZINE MALEATE 5 MG
5 TABLET ORAL EVERY 6 HOURS PRN
Status: DISCONTINUED | OUTPATIENT
Start: 2022-06-19 | End: 2022-06-20 | Stop reason: HOSPADM

## 2022-06-19 RX ORDER — KETOROLAC TROMETHAMINE 15 MG/ML
15 INJECTION, SOLUTION INTRAMUSCULAR; INTRAVENOUS
Status: DISCONTINUED | OUTPATIENT
Start: 2022-06-19 | End: 2022-06-20 | Stop reason: HOSPADM

## 2022-06-19 RX ORDER — CLONAZEPAM 0.5 MG/1
.5-1 TABLET ORAL
Status: DISCONTINUED | OUTPATIENT
Start: 2022-06-19 | End: 2022-06-20 | Stop reason: HOSPADM

## 2022-06-19 RX ORDER — ALBUTEROL SULFATE 0.83 MG/ML
2.5 SOLUTION RESPIRATORY (INHALATION) EVERY 4 HOURS PRN
Status: DISCONTINUED | OUTPATIENT
Start: 2022-06-19 | End: 2022-06-19 | Stop reason: HOSPADM

## 2022-06-19 RX ORDER — ONDANSETRON 4 MG/1
4 TABLET, ORALLY DISINTEGRATING ORAL EVERY 30 MIN PRN
Status: DISCONTINUED | OUTPATIENT
Start: 2022-06-19 | End: 2022-06-20 | Stop reason: HOSPADM

## 2022-06-19 RX ORDER — METHADONE HYDROCHLORIDE 10 MG/ML
110 CONCENTRATE ORAL DAILY
Status: DISCONTINUED | OUTPATIENT
Start: 2022-06-19 | End: 2022-06-20 | Stop reason: HOSPADM

## 2022-06-19 RX ORDER — HYDROMORPHONE HYDROCHLORIDE 2 MG/1
2 TABLET ORAL EVERY 4 HOURS PRN
Status: DISCONTINUED | OUTPATIENT
Start: 2022-06-19 | End: 2022-06-19

## 2022-06-19 RX ORDER — SODIUM CHLORIDE, SODIUM LACTATE, POTASSIUM CHLORIDE, CALCIUM CHLORIDE 600; 310; 30; 20 MG/100ML; MG/100ML; MG/100ML; MG/100ML
INJECTION, SOLUTION INTRAVENOUS CONTINUOUS PRN
Status: DISCONTINUED | OUTPATIENT
Start: 2022-06-19 | End: 2022-06-19

## 2022-06-19 RX ORDER — CEFAZOLIN SODIUM 2 G/50ML
2 SOLUTION INTRAVENOUS
Status: CANCELLED | OUTPATIENT
Start: 2022-06-19

## 2022-06-19 RX ORDER — SODIUM CHLORIDE, SODIUM LACTATE, POTASSIUM CHLORIDE, CALCIUM CHLORIDE 600; 310; 30; 20 MG/100ML; MG/100ML; MG/100ML; MG/100ML
INJECTION, SOLUTION INTRAVENOUS CONTINUOUS
Status: DISCONTINUED | OUTPATIENT
Start: 2022-06-19 | End: 2022-06-20 | Stop reason: HOSPADM

## 2022-06-19 RX ORDER — HYDROMORPHONE HYDROCHLORIDE 2 MG/1
4 TABLET ORAL EVERY 4 HOURS PRN
Status: DISCONTINUED | OUTPATIENT
Start: 2022-06-19 | End: 2022-06-20 | Stop reason: HOSPADM

## 2022-06-19 RX ORDER — ONDANSETRON 4 MG/1
4 TABLET, ORALLY DISINTEGRATING ORAL EVERY 6 HOURS PRN
Status: DISCONTINUED | OUTPATIENT
Start: 2022-06-19 | End: 2022-06-20 | Stop reason: HOSPADM

## 2022-06-19 RX ORDER — FENTANYL CITRATE 50 UG/ML
25 INJECTION, SOLUTION INTRAMUSCULAR; INTRAVENOUS
Status: DISCONTINUED | OUTPATIENT
Start: 2022-06-19 | End: 2022-06-20 | Stop reason: HOSPADM

## 2022-06-19 RX ORDER — IBUPROFEN 200 MG
200 TABLET ORAL EVERY 4 HOURS PRN
Status: ON HOLD | COMMUNITY
End: 2022-07-07

## 2022-06-19 RX ORDER — ONDANSETRON 2 MG/ML
4 INJECTION INTRAMUSCULAR; INTRAVENOUS EVERY 30 MIN PRN
Status: DISCONTINUED | OUTPATIENT
Start: 2022-06-19 | End: 2022-06-19 | Stop reason: HOSPADM

## 2022-06-19 RX ORDER — LIDOCAINE 40 MG/G
CREAM TOPICAL
Status: DISCONTINUED | OUTPATIENT
Start: 2022-06-19 | End: 2022-06-19 | Stop reason: HOSPADM

## 2022-06-19 RX ORDER — ONDANSETRON 2 MG/ML
4 INJECTION INTRAMUSCULAR; INTRAVENOUS EVERY 30 MIN PRN
Status: DISCONTINUED | OUTPATIENT
Start: 2022-06-19 | End: 2022-06-20 | Stop reason: HOSPADM

## 2022-06-19 RX ORDER — NALOXONE HYDROCHLORIDE 0.4 MG/ML
0.4 INJECTION, SOLUTION INTRAMUSCULAR; INTRAVENOUS; SUBCUTANEOUS
Status: DISCONTINUED | OUTPATIENT
Start: 2022-06-19 | End: 2022-06-20 | Stop reason: HOSPADM

## 2022-06-19 RX ORDER — LIDOCAINE HYDROCHLORIDE 20 MG/ML
JELLY TOPICAL PRN
Status: DISCONTINUED | OUTPATIENT
Start: 2022-06-19 | End: 2022-06-19 | Stop reason: HOSPADM

## 2022-06-19 RX ORDER — FENTANYL CITRATE 50 UG/ML
50 INJECTION, SOLUTION INTRAMUSCULAR; INTRAVENOUS EVERY 5 MIN PRN
Status: DISCONTINUED | OUTPATIENT
Start: 2022-06-19 | End: 2022-06-19 | Stop reason: HOSPADM

## 2022-06-19 RX ORDER — HYDROMORPHONE HCL IN WATER/PF 6 MG/30 ML
.2-.4 PATIENT CONTROLLED ANALGESIA SYRINGE INTRAVENOUS
Status: DISCONTINUED | OUTPATIENT
Start: 2022-06-19 | End: 2022-06-19

## 2022-06-19 RX ORDER — ONDANSETRON 2 MG/ML
INJECTION INTRAMUSCULAR; INTRAVENOUS PRN
Status: DISCONTINUED | OUTPATIENT
Start: 2022-06-19 | End: 2022-06-19

## 2022-06-19 RX ORDER — BISACODYL 5 MG
5 TABLET, DELAYED RELEASE (ENTERIC COATED) ORAL DAILY PRN
Status: DISCONTINUED | OUTPATIENT
Start: 2022-06-19 | End: 2022-06-20 | Stop reason: HOSPADM

## 2022-06-19 RX ORDER — METHADONE HYDROCHLORIDE 10 MG/ML
110 CONCENTRATE ORAL DAILY
COMMUNITY

## 2022-06-19 RX ORDER — LABETALOL HYDROCHLORIDE 5 MG/ML
10 INJECTION, SOLUTION INTRAVENOUS EVERY 10 MIN PRN
Status: DISCONTINUED | OUTPATIENT
Start: 2022-06-19 | End: 2022-06-19 | Stop reason: HOSPADM

## 2022-06-19 RX ORDER — NALOXONE HYDROCHLORIDE 0.4 MG/ML
0.2 INJECTION, SOLUTION INTRAMUSCULAR; INTRAVENOUS; SUBCUTANEOUS
Status: DISCONTINUED | OUTPATIENT
Start: 2022-06-19 | End: 2022-06-20 | Stop reason: HOSPADM

## 2022-06-19 RX ORDER — PROPOFOL 10 MG/ML
INJECTION, EMULSION INTRAVENOUS PRN
Status: DISCONTINUED | OUTPATIENT
Start: 2022-06-19 | End: 2022-06-19

## 2022-06-19 RX ORDER — DIAZEPAM 10 MG/2ML
2.5 INJECTION, SOLUTION INTRAMUSCULAR; INTRAVENOUS
Status: DISCONTINUED | OUTPATIENT
Start: 2022-06-19 | End: 2022-06-19 | Stop reason: HOSPADM

## 2022-06-19 RX ORDER — DEXAMETHASONE SODIUM PHOSPHATE 4 MG/ML
4 INJECTION, SOLUTION INTRA-ARTICULAR; INTRALESIONAL; INTRAMUSCULAR; INTRAVENOUS; SOFT TISSUE
Status: DISCONTINUED | OUTPATIENT
Start: 2022-06-19 | End: 2022-06-19 | Stop reason: HOSPADM

## 2022-06-19 RX ORDER — MEPERIDINE HYDROCHLORIDE 25 MG/ML
12.5 INJECTION INTRAMUSCULAR; INTRAVENOUS; SUBCUTANEOUS EVERY 5 MIN PRN
Status: DISCONTINUED | OUTPATIENT
Start: 2022-06-19 | End: 2022-06-19 | Stop reason: HOSPADM

## 2022-06-19 RX ORDER — KETOROLAC TROMETHAMINE 30 MG/ML
INJECTION, SOLUTION INTRAMUSCULAR; INTRAVENOUS PRN
Status: DISCONTINUED | OUTPATIENT
Start: 2022-06-19 | End: 2022-06-19

## 2022-06-19 RX ORDER — ACETAMINOPHEN 325 MG/1
650 TABLET ORAL EVERY 6 HOURS PRN
Status: DISCONTINUED | OUTPATIENT
Start: 2022-06-19 | End: 2022-06-20 | Stop reason: HOSPADM

## 2022-06-19 RX ORDER — DEXAMETHASONE SODIUM PHOSPHATE 4 MG/ML
INJECTION, SOLUTION INTRA-ARTICULAR; INTRALESIONAL; INTRAMUSCULAR; INTRAVENOUS; SOFT TISSUE PRN
Status: DISCONTINUED | OUTPATIENT
Start: 2022-06-19 | End: 2022-06-19

## 2022-06-19 RX ORDER — HYDROMORPHONE HCL IN WATER/PF 6 MG/30 ML
0.4 PATIENT CONTROLLED ANALGESIA SYRINGE INTRAVENOUS EVERY 5 MIN PRN
Status: DISCONTINUED | OUTPATIENT
Start: 2022-06-19 | End: 2022-06-19 | Stop reason: HOSPADM

## 2022-06-19 RX ORDER — BISACODYL 5 MG
10 TABLET, DELAYED RELEASE (ENTERIC COATED) ORAL DAILY PRN
Status: DISCONTINUED | OUTPATIENT
Start: 2022-06-19 | End: 2022-06-20 | Stop reason: HOSPADM

## 2022-06-19 RX ORDER — FENTANYL CITRATE 50 UG/ML
INJECTION, SOLUTION INTRAMUSCULAR; INTRAVENOUS PRN
Status: DISCONTINUED | OUTPATIENT
Start: 2022-06-19 | End: 2022-06-19

## 2022-06-19 RX ORDER — LABETALOL HYDROCHLORIDE 5 MG/ML
10 INJECTION, SOLUTION INTRAVENOUS EVERY 10 MIN PRN
Status: DISCONTINUED | OUTPATIENT
Start: 2022-06-19 | End: 2022-06-20 | Stop reason: HOSPADM

## 2022-06-19 RX ORDER — LORAZEPAM 2 MG/ML
1 INJECTION INTRAMUSCULAR EVERY 6 HOURS PRN
Status: DISCONTINUED | OUTPATIENT
Start: 2022-06-19 | End: 2022-06-20 | Stop reason: HOSPADM

## 2022-06-19 RX ORDER — AMOXICILLIN 250 MG
2 CAPSULE ORAL 2 TIMES DAILY
Status: DISCONTINUED | OUTPATIENT
Start: 2022-06-19 | End: 2022-06-20 | Stop reason: HOSPADM

## 2022-06-19 RX ORDER — PROCHLORPERAZINE 25 MG
12.5 SUPPOSITORY, RECTAL RECTAL EVERY 12 HOURS PRN
Status: DISCONTINUED | OUTPATIENT
Start: 2022-06-19 | End: 2022-06-20 | Stop reason: HOSPADM

## 2022-06-19 RX ORDER — MEPERIDINE HYDROCHLORIDE 25 MG/ML
25 INJECTION INTRAMUSCULAR; INTRAVENOUS; SUBCUTANEOUS
Status: DISCONTINUED | OUTPATIENT
Start: 2022-06-19 | End: 2022-06-20 | Stop reason: HOSPADM

## 2022-06-19 RX ORDER — CEFAZOLIN SODIUM 2 G/50ML
2 SOLUTION INTRAVENOUS SEE ADMIN INSTRUCTIONS
Status: CANCELLED | OUTPATIENT
Start: 2022-06-19

## 2022-06-19 RX ORDER — AMOXICILLIN 250 MG
1 CAPSULE ORAL 2 TIMES DAILY
Status: DISCONTINUED | OUTPATIENT
Start: 2022-06-19 | End: 2022-06-20 | Stop reason: HOSPADM

## 2022-06-19 RX ORDER — OXYCODONE HYDROCHLORIDE 5 MG/1
5 TABLET ORAL EVERY 4 HOURS PRN
Status: DISCONTINUED | OUTPATIENT
Start: 2022-06-19 | End: 2022-06-20 | Stop reason: HOSPADM

## 2022-06-19 RX ADMIN — CEFTRIAXONE 1 G: 1 INJECTION, POWDER, FOR SOLUTION INTRAMUSCULAR; INTRAVENOUS at 02:05

## 2022-06-19 RX ADMIN — LORAZEPAM 1 MG: 2 INJECTION INTRAMUSCULAR; INTRAVENOUS at 18:14

## 2022-06-19 RX ADMIN — HYDROMORPHONE HYDROCHLORIDE 0.2 MG: 0.2 INJECTION, SOLUTION INTRAMUSCULAR; INTRAVENOUS; SUBCUTANEOUS at 08:45

## 2022-06-19 RX ADMIN — SODIUM CHLORIDE: 9 INJECTION, SOLUTION INTRAVENOUS at 02:09

## 2022-06-19 RX ADMIN — FENTANYL CITRATE 50 MCG: 50 INJECTION, SOLUTION INTRAMUSCULAR; INTRAVENOUS at 21:00

## 2022-06-19 RX ADMIN — DEXAMETHASONE SODIUM PHOSPHATE 8 MG: 4 INJECTION, SOLUTION INTRA-ARTICULAR; INTRALESIONAL; INTRAMUSCULAR; INTRAVENOUS; SOFT TISSUE at 21:01

## 2022-06-19 RX ADMIN — HYDROMORPHONE HYDROCHLORIDE 2 MG: 2 TABLET ORAL at 02:00

## 2022-06-19 RX ADMIN — HYDROMORPHONE HYDROCHLORIDE 0.2 MG: 0.2 INJECTION, SOLUTION INTRAMUSCULAR; INTRAVENOUS; SUBCUTANEOUS at 04:16

## 2022-06-19 RX ADMIN — HYDROMORPHONE HYDROCHLORIDE 4 MG: 2 TABLET ORAL at 11:46

## 2022-06-19 RX ADMIN — HYDROMORPHONE HYDROCHLORIDE 0.4 MG: 0.2 INJECTION, SOLUTION INTRAMUSCULAR; INTRAVENOUS; SUBCUTANEOUS at 10:28

## 2022-06-19 RX ADMIN — HYDROMORPHONE HYDROCHLORIDE 2 MG: 2 TABLET ORAL at 07:36

## 2022-06-19 RX ADMIN — SODIUM CHLORIDE: 9 INJECTION, SOLUTION INTRAVENOUS at 09:00

## 2022-06-19 RX ADMIN — METHADONE HYDROCHLORIDE 110 MG: 10 CONCENTRATE ORAL at 11:47

## 2022-06-19 RX ADMIN — KETOROLAC TROMETHAMINE 15 MG: 30 INJECTION, SOLUTION INTRAMUSCULAR at 21:22

## 2022-06-19 RX ADMIN — ONDANSETRON HYDROCHLORIDE 4 MG: 2 INJECTION, SOLUTION INTRAVENOUS at 21:15

## 2022-06-19 RX ADMIN — Medication 1 MG: at 02:23

## 2022-06-19 RX ADMIN — SODIUM CHLORIDE, POTASSIUM CHLORIDE, SODIUM LACTATE AND CALCIUM CHLORIDE: 600; 310; 30; 20 INJECTION, SOLUTION INTRAVENOUS at 20:51

## 2022-06-19 RX ADMIN — LIDOCAINE HYDROCHLORIDE 50 MG: 10 INJECTION, SOLUTION INFILTRATION; PERINEURAL at 21:00

## 2022-06-19 RX ADMIN — SODIUM CHLORIDE: 9 INJECTION, SOLUTION INTRAVENOUS at 00:16

## 2022-06-19 RX ADMIN — SODIUM CHLORIDE: 9 INJECTION, SOLUTION INTRAVENOUS at 17:30

## 2022-06-19 RX ADMIN — PROPOFOL 180 MG: 10 INJECTION, EMULSION INTRAVENOUS at 21:00

## 2022-06-19 RX ADMIN — SODIUM CHLORIDE, POTASSIUM CHLORIDE, SODIUM LACTATE AND CALCIUM CHLORIDE: 600; 310; 30; 20 INJECTION, SOLUTION INTRAVENOUS at 20:01

## 2022-06-19 RX ADMIN — LORAZEPAM 1 MG: 2 INJECTION INTRAMUSCULAR; INTRAVENOUS at 11:46

## 2022-06-19 RX ADMIN — SENNOSIDES AND DOCUSATE SODIUM 1 TABLET: 50; 8.6 TABLET ORAL at 07:36

## 2022-06-19 RX ADMIN — HYDROMORPHONE HYDROCHLORIDE 4 MG: 2 TABLET ORAL at 18:07

## 2022-06-19 NOTE — DISCHARGE SUMMARY
[facility]  Discharge Summary        Jean Paul Gordon MRN# [id number]   YOB: 1950 Age: 72 year old     Date of Admission:  6/18/2022  Date of Discharge:  6/19/2022 11:35 PM  Admitting Physician:  Lasha Deutsch MD  Discharge Physician: Marianne Castaneda PA-C  Discharging Service: Hospitalist     Primary Provider: Jo Sahni  Primary Care Physician Phone Number: [phone]         Discharge Diagnoses/Problem Oriented Hospital Course (Providers):    Jean Paul Gordon was admitted on 6/18/2022 by Lasha Deutsch MD and I would refer you to their history and physical.  The following problems were addressed during his hospitalization:    1. Acute renal colic with 2 mm stone at UVJ with hydronephrosis   2. S/p cysto and stent placement   3. ELENA due to #1  4. HTN           Code Status:      Full Code        Brief Hospital Stay Summary Sent Home With Patient in AVS:        Reason for your hospital stay      You are admitted for intractable abdominal pain due to renal colic.  You   were seen by urology underwent stone extraction and stent placement.    Follow-up with urology as instructed.                  Important Results:      No results for input(s): WBC, HGB, HCT, MCV, PLT in the last 168 hours.  No results for input(s): NA, POTASSIUM, CHLORIDE, CO2, ANIONGAP, GLC, BUN, CR, GFRESTIMATED, GFRESTBLACK, NORIS in the last 168 hours.           Pending Results:        Unresulted Labs Ordered in the Past 30 Days of this Admission     Date and Time Order Name Status Description    6/18/2022 10:30 PM Urine Culture In process             Discharge Instructions and Follow-Up:      Follow-up Appointments     Follow-up and recommended labs and tests       Follow up with primary care provider, Jo Sahni, within 7 days   for hospital follow- up.  No follow up labs or test are needed.               Discharge Disposition:      Discharged to home         Discharge Medications:        Current  "Discharge Medication List      CONTINUE these medications which have NOT CHANGED    Details   atorvastatin (LIPITOR) 10 MG tablet Take 10 mg by mouth daily      dextroamphetamine (DEXTROSTAT) 10 MG tablet Take one tablet Four times a day      hydrochlorothiazide (HYDRODIURIL) 12.5 MG tablet Take 1 tablet by mouth daily      ibuprofen (ADVIL/MOTRIN) 200 MG tablet Take 200 mg by mouth every 4 hours as needed for mild pain      lisinopril (ZESTRIL) 20 MG tablet Take 20 mg by mouth daily      methadone (DOLOPHINE-INTENSOL) 10 MG/ML (HIGH CONC) solution Take 110 mg by mouth daily      clonazePAM (KLONOPIN) 1 MG tablet Take 1/2 - 1 tablet by mouth at bedtime as needed               Allergies:         Allergies   Allergen Reactions     Methylphenidate Shortness Of Breath     Other reaction(s): Stomach Upset     Armodafinil      Other reaction(s): Insomnia  Anorexia     Contrast Dye      Patient unsure of what happens for allergy, but states \"I know I am allergic.\"           Consultations This Hospital Stay:      Consultation during this admission received from urology         Condition and Physical on Discharge:      Discharge condition: Stable   Vitals: Blood pressure 137/72, pulse 65, temperature 98  F (36.7  C), temperature source Oral, resp. rate 16, height 1.778 m (5' 10\"), weight 95.4 kg (210 lb 4.8 oz), SpO2 96 %.  210 lbs 4.8 oz      GENERAL:  Comfortable.  PSYCH: pleasant, oriented, No acute distress.  HEENT:  PERRLA. Normal conjunctiva, normal hearing, nasal mucosa and Oropharynx are normal.  NECK:  Supple, no neck vein distention, adenopathy or bruits, normal thyroid.  HEART:  Normal S1, S2 with no murmur, no pericardial rub, gallops or S3 or S4.  LUNGS:  Clear to auscultation, normal Respiratory effort. No wheezing, rales or ronchi.  ABDOMEN:  Soft, no hepatosplenomegaly, normal bowel sounds. Non-tender, non distended.   EXTREMITIES:  No pedal edema, +2 pulses bilateral and equal.  SKIN:  Dry to touch, No rash, " wound or ulcerations.  NEUROLOGIC:  CN 2-12 intact, BL 5/5 symmetric upper and lower extremity strength, sensation is intact with no focal deficits.         Discharge Time:      <30 mins         Image Results From This Hospital Stay (For Non-EPIC Providers):        Results for orders placed or performed during the hospital encounter of 01/20/22   XR Chest Port 1 View    Narrative    EXAM: XR CHEST PORT 1 VIEW  LOCATION: [facility]  DATE/TIME: 1/20/2022 6:09 PM    INDICATION: COVID, cough.  COMPARISON: None available.      Impression    IMPRESSION: Single AP view of the chest was obtained. Mild enlargement of the cardiac silhouette. Bilateral basilar and subpleural predominant patchy pulmonary opacities, worrisome for infection including atypical infection like COVID 19. No significant   pleural effusion or pneumothorax.   US Lower Extremity Venous Duplex Bilateral    Narrative    EXAM: US LOWER EXTREMITY VENOUS DUPLEX BILATERAL  LOCATION: [facility]  DATE/TIME: 1/20/2022 7:47 PM    INDICATION: Eval for clot  COMPARISON: None.  TECHNIQUE: Venous Duplex ultrasound of bilateral lower extremities with and without compression, augmentation and duplex. Color flow and spectral Doppler with waveform analysis performed.    FINDINGS: Exam includes the common femoral, femoral, popliteal veins as well as segmentally visualized deep calf veins and greater saphenous vein.     RIGHT: No deep vein thrombosis. No superficial thrombophlebitis. No popliteal cyst.    LEFT: No deep vein thrombosis. No superficial thrombophlebitis. Small simple Baker's cyst      Impression    IMPRESSION:  1.  No deep venous thrombosis in the bilateral lower extremities.   NM Lung Scan Perfusion Particulate    Narrative    EXAM: NM LUNG SCAN PERFUSION PARTICULATE  LOCATION: [facility]  DATE/TIME: 1/20/2022 8:09 PM    INDICATION: Cough . Covid pneumonia.  COMPARISON: Chest radiograph from  1/20/2022  TECHNIQUE: 3.4 mCi technetium-99m MAA, IV. Standard lung perfusion imaging.    FINDINGS: Nonsegmental perfusion defects in both lungs, corresponding to areas of airspace consolidation on chest radiograph.      Impression    IMPRESSION:   Nondiagnostic with regards to pulmonary emboli

## 2022-06-19 NOTE — ED PROVIDER NOTES
History   Chief Complaint:  Flank Pain       The history is provided by the patient.      Jean Paul Gordon is a 72 year old male, not on blood thinners, who presents with flank pain that began two nights ago. The patient reports that the pain was so intense that he fell to the floor in pain and needed to crawl out of Cub. He states he had never experienced this pain before. The patient reports that he went home that night and woke up the next day to intermittent pain that progressively worsened overnight and brought him to the urgency room earlier today. The patient denies dysuria, vomiting, and fever.     UR summary:  HPI: Jean Paul Gordon is a 72 y.o. male who presents to the UR for evaluation of flank pain. The patient states this was sudden onset Thursday while shopping. He took ibuprofen and felt mildly improved. Friday his symptoms were improved, he spoke with his daughter who has had kidney stones. He states his pain worsens when breathing or yawning. His pain is now constant and radiates into his abdomen. He denies urinary changes. He notes nausea today. He denies fever.     Laboratory results:   UA microscopic: RBC 26-50, WBC 11-25, WBC Casts 0-2, otherwise WNL  UA w/ Sediment Exam Reflexed per Criteria: Occult Blood, Urine Moderate, otherwise WNL  CBC with auto differential: monocytes 12.7, otherwise WNL   Lipase: WNL   Comp Metabolic Panel: anion gap 7, BUN 28, creatinine 1.90, globulin 3.7, A/G ratio 1.1, bilirubin total 1.7, eGFR 37, otherwise WNL   Lactate, blood: lactate, blood 0.6  COVID-19: negative    CT Abdomen Pelvis stone protocol  1. 2 x 2 millimeter obstructing stone at the left ureterovesical junction resulting in mild hydronephrosis. 2. Moderate size esophageal hiatal hernia with wall thickening at the level of the GE junction. Esophageal neoplasm not excluded. GI consultation recommended.     Review of Systems   Constitutional: Negative for fever.   Gastrointestinal: Negative for  "vomiting.   Genitourinary: Positive for flank pain. Negative for dysuria.   All other systems reviewed and are negative.        Allergies:  Methylphenidate  Armodafinil  Contrast Dye    Medications:  albuterol   atorvastatin   clonazepam  doxycycline hyclate  dextroamphetamine sulfate  hydrochlorothiazide  lisinopril   methadone    Past Medical History:     Hypogonadism   Hypertension   Narcolepsy without cataplexy   Erosion of cornea      Family History:    Father- bladder cancer  Mother- bladder cancer     Social History:  Presents with partner.   PCP: MD Shanika    Physical Exam     Patient Vitals for the past 24 hrs:   BP Temp Temp src Pulse Resp SpO2 Height Weight   06/18/22 2222 139/77 98.2  F (36.8  C) Temporal 74 18 99 % 1.778 m (5' 10\") 86.2 kg (190 lb)       Physical Exam  Constitutional:  Oriented to person, place, and time. Minor stress due to pain.   HENT:   Head:    Normocephalic.   Mouth/Throat:   Oropharynx is clear and moist.   Eyes:    EOM are normal. Pupils are equal, round, and reactive to light.   Neck:    Neck supple.   Cardiovascular:  Normal rate, regular rhythm and normal heart sounds.      Exam reveals no gallop and no friction rub.       No murmur heard.  Pulmonary/Chest:  Effort normal and breath sounds normal.      No respiratory distress. No wheezes. No rales.      No reproducible chest wall pain.  Abdominal:   Soft. No distension. No tenderness. No rebound and no guarding.   Musculoskeletal:  Normal range of motion. Pain on percussion of left flank.   Neurological:   Alert and oriented to person, place, and time.           Moves all 4 extremities spontaneously    Skin:    No rash noted. No pallor.     Emergency Department Course     Laboratory:  Urine culture pending     Emergency Department Course:     Reviewed:  I reviewed nursing notes, vitals, past medical history and Care Everywhere    Assessments:  2230 I obtained history and examined the patient as noted above. We discussed " admission.     Consults:   I consulted with Dr. Chavez, urology, regarding the patient.     I consulted with Dr. Deutsch, hospitalist, regarding the patient's history and presentation here in the emergency department who accepted the patient for admission.     Interventions:   tamsulosin 0.4 MG PO   Dilaudid 0.5 MG IV     Disposition:  The patient was admitted to the hospital under the care of Dr. Deutsch.     Impression & Plan     Medical Decision Makin-year-old male sent by the UR for further consultation and admission with known ureteral calculi and acute kidney injury.  Review of care everywhere his creatinine has bumped up from 1.1 in February to 1.9 now.  UA has mild pyuria negative leuk esterase negative nitrates.  No fever negative lactic acid no elevated white count.  Patient is at this is more ureteral irritation rather than infection although this remains to be seen.  Antibiotics were given prior to arrival.  I did discuss the case with urology on-call does agree with admission.  He will be admitted to the hospitalist, will be n.p.o. after midnight for possible procedure in the a.m.      Diagnosis:    ICD-10-CM    1. Nephrolithiasis  N20.0 Case Request: CYSTOSCOPY, WITH URETERAL STENT INSERTION     Case Request: CYSTOSCOPY, WITH URETERAL STENT INSERTION   2. Renal colic  N23    3. ELENA (acute kidney injury) (H)  N17.9        Scribe Disclosure:  IAnya, am serving as a scribe at 10:30 PM on 2022 to document services personally performed by Shar Zhao MD based on my observations and the provider's statements to me.   ILeatha, am serving as a scribe  at 11:01 PM on 2022 to document services personally performed by Shar Zhao MD based on my observations and the provider's statements to me.          Shar Zhao MD  22 [id number]

## 2022-06-19 NOTE — CONSULTS
"Urology Consult    Name: Jean Paul Gordon    MRN: [id number]   YOB: 1950               Chief Complaint:   Obstructing left ureteral stone     History is obtained from the patient and chart review          History of Present Illness:   Jean Paul Gordon is a 72 year old male with no prior history of renal stones who presented to urgent care 3 days ago with sudden onset of left flank pain. Further work up revealed some evidence of pyuria and a obstructing 2 mm left UVJ stone (images not available just the report is accessible through [facility]). He was discharged home with oral antibiotic but presented to [facility] ED last night with worsening left flank pain and was found to have an elevated creatinine of 1.9 (baseline 1.1). He was then admitted to medicine and received IV hydration overnight with slight improvement in creatinine (1.6). He still has significant pain.     He does not recall any prior history of nephrolithiasis or any related intervention            Past Medical History:     Past Medical History:   Diagnosis Date     Anxiety state, unspecified      Hypertension      Myocarditis (H)      Narcolepsy             Past Surgical History:   No past surgical history on file.         Social History:     Social History     Tobacco Use     Smoking status: Never Smoker     Smokeless tobacco: Not on file   Substance Use Topics     Alcohol use: No            Family History:   No family history on file.         Allergies:     Allergies   Allergen Reactions     Methylphenidate Shortness Of Breath     Other reaction(s): Stomach Upset     Armodafinil      Other reaction(s): Insomnia  Anorexia     Contrast Dye      Patient unsure of what happens for allergy, but states \"I know I am allergic.\"            Medications:     Current Facility-Administered Medications   Medication     acetaminophen (TYLENOL) tablet 650 mg    Or     acetaminophen (TYLENOL) Suppository 650 mg     bisacodyl (DULCOLAX) EC tablet 5 mg    Or "     bisacodyl (DULCOLAX) EC tablet 10 mg     cefTRIAXone (ROCEPHIN) 1 g vial to attach to  mL bag for ADULTS or NS 50 mL bag for PEDS     clonazePAM (klonoPIN) tablet 0.5-1 mg     HYDROmorphone (DILAUDID) tablet 4 mg     HYDROmorphone (PF) (DILAUDID) injection 0.5 mg     LORazepam (ATIVAN) injection 1 mg     melatonin tablet 1 mg     methadone (DOLOPHINE-INTENSOL) 10 MG/ML (HIGH CONC) solution 110 mg     naloxone (NARCAN) injection 0.2 mg    Or     naloxone (NARCAN) injection 0.4 mg    Or     naloxone (NARCAN) injection 0.2 mg    Or     naloxone (NARCAN) injection 0.4 mg     ondansetron (ZOFRAN ODT) ODT tab 4 mg    Or     ondansetron (ZOFRAN) injection 4 mg     prochlorperazine (COMPAZINE) injection 5 mg    Or     prochlorperazine (COMPAZINE) tablet 5 mg    Or     prochlorperazine (COMPAZINE) suppository 12.5 mg     senna-docusate (SENOKOT-S/PERICOLACE) 8.6-50 MG per tablet 1 tablet    Or     senna-docusate (SENOKOT-S/PERICOLACE) 8.6-50 MG per tablet 2 tablet     sodium chloride 0.9% infusion             Review of Systems:    ROS: 10 point ROS neg other than the symptoms noted above in the HPI           Physical Exam:   VS:  T: 98.4    HR: 74    BP: 169/88    RR: 18   GEN:  AOx3.  NAD.    CV:  RRR  LUNGS: Non-labored breathing.   BACK:  No midline or CVA tenderness.  ABD:  Soft.  NT.  ND.  No rebound or guarding.  No masses. Mild to moderate left CVA tenderness   :  Deferred   EXT:  Warm, well perfused.  No edema.  NEURO:  CN grossly intact.            Data:   All laboratory data reviewed:    Recent Labs   Lab 06/19/22 0518   WBC 5.7   HGB 11.8*        Recent Labs   Lab 06/19/22 0518      POTASSIUM 4.1   CHLORIDE 108   CO2 27   BUN 23   CR 1.59*   GLC 84   NORIS 9.0     Specimen:  Urine - Urine specimen (specimen)   Ref Range & Units 1 d ago   RBC 0-2, None Seen /HPF 26-50 Abnormal     WBC 0-2, 3-5, None Seen /HPF 11-25 Abnormal     BACTERIA                  None Seen, Few Bacteria/HPF Few     EPITHELIAL CELLS          None Seen, Few Epi/HPF None Seen    OTHER  White Cell Clumps Present    HYALINE CASTS 0-2, 3-5 /LPF            /LPF 0-2    WBC CASTS (none) /LPF            /LPF 0-2 Abnormal         Outside CT scan (images not available to review)     CT ABDOMEN PELVIS STONE PROTOCOL WO    Result Date: 6/18/2022  For Patients: As a result of the 21st Century Cures Act, medical imaging exams and procedure reports are released immediately into your electronic medical record. You may view this report before your referring provider. If you have questions, please contact your health care provider. EXAM: CT ABDOMEN PELVIS STONE PROTOCOL WO LOCATION: [facility] [place] DATE/TIME: 6/18/2022 5:42 PM INDICATION: Flank pain, kidney stone suspected COMPARISON: CT abdomen and pelvis 10/16/2019 TECHNIQUE: CT scan of the abdomen and pelvis was performed without oral or IV contrast. Multiplanar reformats were obtained. Dose reduction techniques were used. CONTRAST: None. FINDINGS: LOWER CHEST: Mild fibrotic changes right lower lung field. Small nodular opacities within the right lung base adjacent to the diaphragm are unchanged and should be benign. No effusions. Moderate size esophageal hiatal hernia with wall thickening at the GE junction, more pronounced compared to the prior study HEPATOBILIARY: Normal. PANCREAS: Normal. SPLEEN: Normal. ADRENAL GLANDS: Normal. KIDNEY/BLADDER: Mild left hydronephrosis with perinephric stranding and slight dilatation of the left ureter to the level of the ureterovesical junction where there is an obstructing 2 x 2 mm stone on image 149 of series 3. No additional stones. The right kidney is normal. No bladder stones. BOWEL: Normal. LYMPH NODES: Normal. VASCULATURE: Atherosclerotic disease abdominal aorta. PELVIC ORGANS: Normal. MUSCULOSKELETAL: Degenerative disc disease L5 level.     1. 2 x 2 millimeter obstructing stone at the left ureterovesical junction resulting in mild  hydronephrosis. 2. Moderate size esophageal hiatal hernia with wall thickening at the level of the GE junction. Esophageal neoplasm not excluded. GI consultation recommended.              Impression and Plan:   Impression:   Jean Paul Gordon is a 72 year old male with obstructing left ureteral stone causing inconsolable pain and elevated creatinine       We discussed different management approach including observation vs stent placement. Pros and cons of each approach reviewed. Definite stone treatment should be postponed given the presence of pyuria and possible UTI. Risks associated with stent placement including but not limited to worsening infection bleeding and damage to surrounding structures were reviewed. He is aware of stent related discomfort and migration as potential issues postoperatively.     Plan:     - proceed with cystoscopy and left ureteral stent placement      Haroon Chavez MD   Department of Urology   [facility]

## 2022-06-19 NOTE — ED TRIAGE NOTES
"    Pt arrives to ED fdrom urgency room after being found out he has a L kidney stone that is causing pain and a UTI. Pt received IV abx, IV pain meds, and CT at UR. 18g PIV in RUE. Pt sent over here from UR for \"ultrasound to break it up\".   "

## 2022-06-19 NOTE — ED NOTES
"[facility]  ED Nurse Handoff Report    Jean Paul Gordon is a 72 year old male   ED Chief complaint: Flank Pain  . ED Diagnosis:   Final diagnoses:   Renal colic   ELENA (acute kidney injury) (H)     Allergies:   Allergies   Allergen Reactions     Methylphenidate Shortness Of Breath     Other reaction(s): Stomach Upset     Armodafinil      Other reaction(s): Insomnia  Anorexia     Contrast Dye      Patient unsure of what happens for allergy, but states \"I know I am allergic.\"       Code Status: Full Code  Activity level - Baseline/Home:  Independent. Activity Level - Current:   Independent. Lift room needed: No. Bariatric: No   Needed: No   Isolation: No. Infection: Not Applicable.     Vital Signs:   Vitals:    06/18/22 2222   BP: 139/77   Pulse: 74   Resp: 18   Temp: 98.2  F (36.8  C)   TempSrc: Temporal   SpO2: 99%   Weight: 86.2 kg (190 lb)   Height: 1.778 m (5' 10\")       Cardiac Rhythm:  ,      Pain level:    Patient confused: No. Patient Falls Risk: No.   Elimination Status: Has voided   Patient Report - Initial Complaint: Flank pain. Focused Assessment:  Jean Paul Gordon is a 72 year old male, not on blood thinners, who presents with flank pain that began two nights ago. The patient reports that the pain was so intense that he fell to the floor in pain and needed to crawl out of [place]. He states he had never experienced this pain before. The patient reports that he went home that night and woke up the next day to intermittent pain that progressively worsened overnight and brought him to the urgency room earlier today. The patient denies dysuria, vomiting, and fever.   Tests Performed:   No orders to display   . Abnormal Results: Labs Ordered and Resulted from Time of ED Arrival to Time of ED Departure - No data to display.   Treatments provided: See MAR  Family Comments: N/A  OBS brochure/video discussed/provided to patient:  Yes  ED Medications:   Medications   HYDROmorphone (PF) " (DILAUDID) injection 0.5 mg (0.5 mg Intravenous Given 6/18/22 6367)   sodium chloride 0.9% infusion ( Intravenous New Bag 6/19/22 0016)   tamsulosin (FLOMAX) capsule 0.4 mg (0.4 mg Oral Given 6/18/22 3852)     Drips infusing:  No  For the majority of the shift, the patient's behavior Green. Interventions performed were N/A.    Sepsis treatment initiated: No     Patient tested for COVID 19 prior to admission: NO    ED Nurse Name/Phone Number: Leila Rendon RN,   12:47 AM  RECEIVING UNIT ED HANDOFF REVIEW    Above ED Nurse Handoff Report was reviewed: Yes  Reviewed by: Michael Bennett RN on June 19, 2022 at 12:51 AM

## 2022-06-19 NOTE — PLAN OF CARE
PRIMARY DIAGNOSIS:  RENAL COLIC  OUTPATIENT/OBSERVATION GOALS TO BE MET BEFORE DISCHARGE:  1. Diagnostic test and consults (if applicable) complete: Yes, CT 2 x 2 mm stone    2. Vitals signs stable or return to baseline: Yes    3. Tolerate oral intake to maintain hydration:  NPO    4. Pain status: Improved-controlled with oral pain medications.    5. Tolerating oral antibiotics or has plans for home infusion setup:  IV Rocephin    6. Return to near baseline physical activity: No    Discharge Planner Nurse   Safe discharge environment identified: Yes  Barriers to discharge: Yes, flank pain.       Entered by: Michael Bennett RN 06/19/2022 3:19 AM  A & O  x 4, SBA, pain managed with oral dilaudid,  if kidney fuction does not improve will go for a stent, IV ceftriaxone,  ml/hr, NPO, straining urine.    Please review provider order for any additional goals.   Nurse to notify provider when observation goals have been met and patient is ready for discharge.Goal Outcome Evaluation:

## 2022-06-19 NOTE — H&P (VIEW-ONLY)
"Urology Consult    Name: Jean Paul Gordon    MRN: [id number]   YOB: 1950               Chief Complaint:   Obstructing left ureteral stone     History is obtained from the patient and chart review          History of Present Illness:   Jean Paul Gordon is a 72 year old male with no prior history of renal stones who presented to urgent care 3 days ago with sudden onset of left flank pain. Further work up revealed some evidence of pyuria and a obstructing 2 mm left UVJ stone (images not available just the report is accessible through [facility]). He was discharged home with oral antibiotic but presented to [facility] ED last night with worsening left flank pain and was found to have an elevated creatinine of 1.9 (baseline 1.1). He was then admitted to medicine and received IV hydration overnight with slight improvement in creatinine (1.6). He still has significant pain.     He does not recall any prior history of nephrolithiasis or any related intervention            Past Medical History:     Past Medical History:   Diagnosis Date     Anxiety state, unspecified      Hypertension      Myocarditis (H)      Narcolepsy             Past Surgical History:   No past surgical history on file.         Social History:     Social History     Tobacco Use     Smoking status: Never Smoker     Smokeless tobacco: Not on file   Substance Use Topics     Alcohol use: No            Family History:   No family history on file.         Allergies:     Allergies   Allergen Reactions     Methylphenidate Shortness Of Breath     Other reaction(s): Stomach Upset     Armodafinil      Other reaction(s): Insomnia  Anorexia     Contrast Dye      Patient unsure of what happens for allergy, but states \"I know I am allergic.\"            Medications:     Current Facility-Administered Medications   Medication     acetaminophen (TYLENOL) tablet 650 mg    Or     acetaminophen (TYLENOL) Suppository 650 mg     bisacodyl (DULCOLAX) EC tablet 5 mg    Or "     bisacodyl (DULCOLAX) EC tablet 10 mg     cefTRIAXone (ROCEPHIN) 1 g vial to attach to  mL bag for ADULTS or NS 50 mL bag for PEDS     clonazePAM (klonoPIN) tablet 0.5-1 mg     HYDROmorphone (DILAUDID) tablet 4 mg     HYDROmorphone (PF) (DILAUDID) injection 0.5 mg     LORazepam (ATIVAN) injection 1 mg     melatonin tablet 1 mg     methadone (DOLOPHINE-INTENSOL) 10 MG/ML (HIGH CONC) solution 110 mg     naloxone (NARCAN) injection 0.2 mg    Or     naloxone (NARCAN) injection 0.4 mg    Or     naloxone (NARCAN) injection 0.2 mg    Or     naloxone (NARCAN) injection 0.4 mg     ondansetron (ZOFRAN ODT) ODT tab 4 mg    Or     ondansetron (ZOFRAN) injection 4 mg     prochlorperazine (COMPAZINE) injection 5 mg    Or     prochlorperazine (COMPAZINE) tablet 5 mg    Or     prochlorperazine (COMPAZINE) suppository 12.5 mg     senna-docusate (SENOKOT-S/PERICOLACE) 8.6-50 MG per tablet 1 tablet    Or     senna-docusate (SENOKOT-S/PERICOLACE) 8.6-50 MG per tablet 2 tablet     sodium chloride 0.9% infusion             Review of Systems:    ROS: 10 point ROS neg other than the symptoms noted above in the HPI           Physical Exam:   VS:  T: 98.4    HR: 74    BP: 169/88    RR: 18   GEN:  AOx3.  NAD.    CV:  RRR  LUNGS: Non-labored breathing.   BACK:  No midline or CVA tenderness.  ABD:  Soft.  NT.  ND.  No rebound or guarding.  No masses. Mild to moderate left CVA tenderness   :  Deferred   EXT:  Warm, well perfused.  No edema.  NEURO:  CN grossly intact.            Data:   All laboratory data reviewed:    Recent Labs   Lab 06/19/22 0518   WBC 5.7   HGB 11.8*        Recent Labs   Lab 06/19/22 0518      POTASSIUM 4.1   CHLORIDE 108   CO2 27   BUN 23   CR 1.59*   GLC 84   NORIS 9.0     Specimen:  Urine - Urine specimen (specimen)   Ref Range & Units 1 d ago   RBC 0-2, None Seen /HPF 26-50 Abnormal     WBC 0-2, 3-5, None Seen /HPF 11-25 Abnormal     BACTERIA                  None Seen, Few Bacteria/HPF Few     EPITHELIAL CELLS          None Seen, Few Epi/HPF None Seen    OTHER  White Cell Clumps Present    HYALINE CASTS 0-2, 3-5 /LPF            /LPF 0-2    WBC CASTS (none) /LPF            /LPF 0-2 Abnormal         Outside CT scan (images not available to review)     CT ABDOMEN PELVIS STONE PROTOCOL WO    Result Date: 6/18/2022  For Patients: As a result of the 21st Century Cures Act, medical imaging exams and procedure reports are released immediately into your electronic medical record. You may view this report before your referring provider. If you have questions, please contact your health care provider. EXAM: CT ABDOMEN PELVIS STONE PROTOCOL WO LOCATION: [facility] [place] DATE/TIME: 6/18/2022 5:42 PM INDICATION: Flank pain, kidney stone suspected COMPARISON: CT abdomen and pelvis 10/16/2019 TECHNIQUE: CT scan of the abdomen and pelvis was performed without oral or IV contrast. Multiplanar reformats were obtained. Dose reduction techniques were used. CONTRAST: None. FINDINGS: LOWER CHEST: Mild fibrotic changes right lower lung field. Small nodular opacities within the right lung base adjacent to the diaphragm are unchanged and should be benign. No effusions. Moderate size esophageal hiatal hernia with wall thickening at the GE junction, more pronounced compared to the prior study HEPATOBILIARY: Normal. PANCREAS: Normal. SPLEEN: Normal. ADRENAL GLANDS: Normal. KIDNEY/BLADDER: Mild left hydronephrosis with perinephric stranding and slight dilatation of the left ureter to the level of the ureterovesical junction where there is an obstructing 2 x 2 mm stone on image 149 of series 3. No additional stones. The right kidney is normal. No bladder stones. BOWEL: Normal. LYMPH NODES: Normal. VASCULATURE: Atherosclerotic disease abdominal aorta. PELVIC ORGANS: Normal. MUSCULOSKELETAL: Degenerative disc disease L5 level.     1. 2 x 2 millimeter obstructing stone at the left ureterovesical junction resulting in mild  hydronephrosis. 2. Moderate size esophageal hiatal hernia with wall thickening at the level of the GE junction. Esophageal neoplasm not excluded. GI consultation recommended.              Impression and Plan:   Impression:   Jean Paul Gordon is a 72 year old male with obstructing left ureteral stone causing inconsolable pain and elevated creatinine       We discussed different management approach including observation vs stent placement. Pros and cons of each approach reviewed. Definite stone treatment should be postponed given the presence of pyuria and possible UTI. Risks associated with stent placement including but not limited to worsening infection bleeding and damage to surrounding structures were reviewed. He is aware of stent related discomfort and migration as potential issues postoperatively.     Plan:     - proceed with cystoscopy and left ureteral stent placement      Haroon Chavez MD   Department of Urology   [facility]

## 2022-06-19 NOTE — PLAN OF CARE
PRIMARY DIAGNOSIS:  RENAL COLIC  OUTPATIENT/OBSERVATION GOALS TO BE MET BEFORE DISCHARGE:  1. Diagnostic test and consults (if applicable) complete: Yes, CT 2 x 2 mm stone    2. Vitals signs stable or return to baseline: Yes    3. Tolerate oral intake to maintain hydration:  NPO, with ice chips    4. Pain status: Improved-controlled with oral and IV pain medications.     5. Tolerating oral antibiotics or has plans for home infusion setup:  IV Rocephin    6. Return to near baseline physical activity: No    Discharge Planner Nurse   Safe discharge environment identified: Yes  Barriers to discharge: Yes, flank pain.       Entered by: Dania Michaud RN 06/19/2022 9:50 AM    A&Ox4, SBA in room, pain managed with oral and IV dilaudid. There is a urology consult that has been placed because patient has yet to be able to pass stone and is still in a lot of pain. He is running on NS 125mL/hr, NPO incase of a procedure, we are straining urine with no luck so far.    Please review provider order for any additional goals.   Nurse to notify provider when observation goals have been met and patient is ready for discharge.Goal Outcome Evaluation:

## 2022-06-19 NOTE — H&P
[facility]    History and Physical - Hospitalist Service       Date of Admission:  6/18/2022    Assessment & Plan      Jean Paul Gordon is a 72 year old male admitted on 6/18/2022.    He was seen at an [facility] urgent care center with left flank pain for the last 2 days and has been taking large amounts of ibuprofen with minimal relief.  The pain was severe and sudden in onset.  Patient is restless and nauseated.  No fever or chills and denies any increased frequency or burning with urination.    Patient is a CT abdomen with stone protocol showed 2 x 2 millimeter obstructing stone in the left ureterovesical junction with mild hydronephrosis.  BMP showed BUN/creatinine of 28/1.9 and lactate is negative.  UA showed 11-25 WBCs.  Urine and blood cultures were drawn and patient was sent to the ER.    Plan    1. Left-sided ureteric obstruction by a 2 x 2 millimeter stone at the ureterovesical junction causing hydronephrosis.    Pain controlled with Tylenol and opioids.  Avoid NSAIDs due to ELENA.    Start Flomax.    Urology consult, if renal function does not improve he will go for ureteric stenting.    Empirical antibiotics due to pyuria, continue on ceftriaxone.    2. ELENA.    BUN/creatinine was 28/1.9, baseline 19/0.7.    Multifactorial including NSAIDs induced nephropathy, obstructive uropathy as well as prerenal ELENA due to decreased p.o. intake.    Continue IV fluids and 125 mill per hour and recheck renal function in the morning.    3. Essential hypertension.    Prior to admission on lisinopril and hydrochlorothiazide.  Will hold both due to ELENA.    4. Dyslipidemia.    Patient has not been using atorvastatin.    5. Anxiety disorder.    Prior to admission on Klonopin as needed, continued.    6. Narcolepsy without cataplexy.    Continue dextroamphetamine.         Diet:  N.p.o. at midnight.  DVT Prophylaxis: Pneumatic Compression Devices  Ricardo Catheter: Not present  Central Lines: None  Cardiac  "Monitoring: None  Code Status:  Full code    Clinically Significant Risk Factors Present on Admission                  # Overweight: Estimated body mass index is 27.26 kg/m  as calculated from the following:    Height as of this encounter: 1.778 m (5' 10\").    Weight as of this encounter: 86.2 kg (190 lb).      Disposition Plan   Expected Discharge:  6/19  Anticipated discharge location:  Awaiting care coordination huddle  Delays:            The patient's care was discussed with the Patient.    Lasha Deutsch MD  Hospitalist Service  [facility]  Securely message with the Vocera Web Console (learn more here)  Text page via [facility] Paging/Directory         ______________________________________________________________________    Chief Complaint   Left flank pain    History is obtained from the patient    History of Present Illness   Jean Paul Gordon is a 72 year old male admitted on 6/18/2022.    He was seen at an [facility] urgent care center with left flank pain for the last 2 days and has been taking large amounts of ibuprofen with minimal relief.  The pain was severe and sudden in onset.  Patient is restless and nauseated.  No fever or chills and denies any increased frequency or burning with urination.    Patient is a CT abdomen with stone protocol showed 2 x 2 millimeter obstructing stone in the left ureterovesical junction with mild hydronephrosis.  BMP showed BUN/creatinine of 28/1.9 and lactate is negative.  UA showed 11-25 WBCs.  Urine and blood cultures were drawn and patient was sent to the ER.    Review of Systems    The 10 point Review of Systems is negative other than noted in the HPI or here.     Past Medical History    I have reviewed this patient's medical history and updated it with pertinent information if needed.   Past Medical History:   Diagnosis Date     Anxiety state, unspecified      Hypertension      Myocarditis (H)      Narcolepsy        Past Surgical History   I have " "reviewed this patient's surgical history and updated it with pertinent information if needed.  No past surgical history on file.    Social History   I have reviewed this patient's social history and updated it with pertinent information if needed.  Social History     Tobacco Use     Smoking status: Never Smoker   Substance Use Topics     Alcohol use: No     Drug use: No       Family History     No significant family history, including no history of: Kidney stones    Prior to Admission Medications   Prior to Admission Medications   Prescriptions Last Dose Informant Patient Reported? Taking?   ClonazePAM (KLONOPIN PO)  Self Yes No   LISINOPRIL PO   Yes No   METHADONE HCL PO   Yes No   NONFORMULARY   Yes No   Sig: oxygen-air delivery systems (HOME OXYGEN)   UNABLE TO FIND   Yes No   Sig: \"Dexastat\"   albuterol (PROAIR HFA/PROVENTIL HFA/VENTOLIN HFA) 108 (90 Base) MCG/ACT inhaler   Yes No   Sig: Inhale 1-2 puffs into the lungs   atorvastatin (LIPITOR) 10 MG tablet   Yes No   Sig: Take 10 mg by mouth daily   dextroamphetamine (DEXTROSTAT) 10 MG tablet   Yes No   Sig: Take one tablet Four times a day   doxycycline hyclate (VIBRA-TABS) 100 MG tablet   Yes No   hydrochlorothiazide (HYDRODIURIL) 12.5 MG tablet   Yes No   Sig: Take 1 tablet by mouth daily   ibuprofen (ADVIL/MOTRIN) 800 MG tablet   No No   Sig: Take 1 tablet (800 mg) by mouth every 8 hours as needed for moderate pain   lisinopril (ZESTRIL) 20 MG tablet   Yes No   Sig: Take 20 mg by mouth   predniSONE (DELTASONE) 20 MG tablet   Yes No   testosterone (ANDRODERM) 2 MG/24HR 24 hr patch   Yes No   Sig: Apply patch daily to clean skin on back, abdomen, arms, thighs. Not to genitals. Remove old patch before applying new on   vitamin D3 (CHOLECALCIFEROL) 1.25 MG (88420 UT) capsule   Yes No   Sig: Take 50,000 Units by mouth      Facility-Administered Medications: None     Allergies   Allergies   Allergen Reactions     Methylphenidate Shortness Of Breath     Other " "reaction(s): Stomach Upset     Armodafinil      Other reaction(s): Insomnia  Anorexia     Contrast Dye      Patient unsure of what happens for allergy, but states \"I know I am allergic.\"       Physical Exam   Vital Signs: Temp: 98.2  F (36.8  C) Temp src: Temporal BP: 139/77 Pulse: 74   Resp: 18 SpO2: 99 % O2 Device: None (Room air)    Weight: 190 lbs 0 oz    General Appearance: Alert awake and oriented x3.  Appears to be in pain.  Eyes: No icterus.  HEENT: Moist mucosa  Respiratory: Clear to auscultation  Cardiovascular: S1 and S2 is normal  GI: Left flank tenderness and mild left lower quadrant tenderness.  Lymph/Hematologic: Not examined  Genitourinary: Not examined  Skin: No rash  Musculoskeletal: Normal tone  Neurologic: Nonfocal  Psychiatric: Normal mood    Data   Data reviewed today: I reviewed all medications, new labs and imaging results over the last 24 hours.     No lab results found in last 7 days.    No results found for this or any previous visit (from the past 24 hour(s)).  "

## 2022-06-19 NOTE — PLAN OF CARE
ROOM # 204-1    Living Situation Home  Facility name:  : Spouse    Activity level at baseline: Independent  Activity level on admit: SBA    Who will be transporting you at discharge: spouse    Patient registered to observation; given Patient Bill of Rights; given the opportunity to ask questions about observation status and their plan of care.  Patient has been oriented to the observation room, bathroom and call light is in place.    Discussed discharge goals and expectations with patient/family.         Goal Outcome Evaluation:

## 2022-06-19 NOTE — PHARMACY
"Pharmacy contacted Methadone clinic. Methadone dose confirmed with [facility] after hours answering service  Addiction counselor (if known) Dickey  Maintenance dose: 110mg daily  Patient receives (daily dose dispensed or take out dose every \"__\" days):   Patient receives takeout - last received takeout on 6/7/22 for the days 6/8/22-6/20/22      "

## 2022-06-19 NOTE — PROGRESS NOTES
[facility]  Hospitalist Progress Note  Marianne Castaneda PA-C 06/19/2022    Reason for Stay (Diagnosis): renal colic          Assessment and Plan:      Jean Paul Gordon is a 72 year old male admitted on 6/18/2022.     He was seen at an [facility] urgent care center with left flank pain for the last 2 days and has been taking large amounts of ibuprofen with minimal relief.  CT abdomen with stone protocol showed 2 x 2 millimeter obstructing stone in the left ureterovesical junction with mild hydronephrosis.  BMP showed BUN/creatinine of 28/1.9 and lactate is negative.  UA showed 11-25 WBCs.  Urine and blood cultures were drawn and patient was sent to the ER.     1. Left-sided ureteric obstruction by a 2 x 2 millimeter stone at the ureterovesical junction causing hydronephrosis.    Start Flomax.    Pain control still suboptimal, urology consult for ureteric cystoscopy in stenting.    Empirical antibiotics due to pyuria given in emergency room, continue on ceftriaxone for now.    Acute on chronic pain, resume PTA methadone dosing along with Dilaudid     2. ELENA.    BUN/creatinine was 28/1.9, baseline 19/0.7.    Multifactorial including NSAIDs induced nephropathy, obstructive uropathy as well as prerenal ELENA due to decreased p.o. intake.    Creatinine improved today but not yet at baseline     Avoid nephrotoxins     Continue IV fluids and 125 mill per hour     3. Essential hypertension.    Prior to admission on lisinopril and hydrochlorothiazide.  Will hold both due to ELENA.     4. Dyslipidemia.    Patient has not been using atorvastatin.     5. Anxiety disorder.    Prior to admission on Klonopin as needed, continued.     6. Narcolepsy without cataplexy.    Continue dextroamphetamine.    7.  History of chronic pain  -Appreciate pharmacy assistance, on PTA methadone which is resumed.  We will give 1 dose of IV Ativan given increased anxiety           Diet:  N.p.o, await surgery  DVT Prophylaxis: Pneumatic  "Compression Devices  Ricardo Catheter: Not present  Central Lines: None  Cardiac Monitoring: None  Code Status:  Full code        Clinically Significant Risk Factors Present on Admission                     # Overweight: Estimated body mass index is 27.26 kg/m  as calculated from the following:    Height as of this encounter: 1.778 m (5' 10\").    Weight as of this encounter: 86.2 kg (190 lb).            Disposition Plan     Expected Discharge:  6/19. ;likelyt after OR   Anticipated discharge location:  Awaiting care coordination huddle  Delays: Await OR timing               Interval History (Subjective):      C/o pain, o/w no issues                  Physical Exam:      Last Vital Signs:  BP (!) 169/88 (BP Location: Right arm)   Pulse 74   Temp 98.4  F (36.9  C) (Oral)   Resp 18   Ht 1.778 m (5' 10\")   Wt 95.4 kg (210 lb 4.8 oz)   SpO2 99%   BMI 30.17 kg/m        Constitutional: Awake, alert, cooperative, mild distress   Respiratory: Clear to auscultation bilaterally, no crackles or wheezing   Cardiovascular: Regular rate and rhythm, normal S1 and S2, and no murmur noted   Abdomen: Normal bowel sounds, soft, non-distended, +LLQ tenderness   Skin: No rashes, no cyanosis, dry to touch   Neuro: Alert and oriented x3, no weakness, numbness, memory loss   Extremities: No edema, normal range of motion   Other(s):        All other systems: Negative          Medications:      All current medications were reviewed with changes reflected in problem list.         Data:      All new lab and imaging data was reviewed.   Labs:       Lab Results   Component Value Date     06/19/2022     01/20/2022     10/16/2019     11/02/2015     07/09/2005    Lab Results   Component Value Date    CHLORIDE 108 06/19/2022    CHLORIDE 105 01/20/2022    CHLORIDE 105 10/16/2019    CHLORIDE 103 11/02/2015    CHLORIDE 99 07/09/2005    Lab Results   Component Value Date    BUN 23 06/19/2022    BUN 19 01/20/2022    BUN " 20 10/16/2019    BUN 14 11/02/2015    BUN 13 07/09/2005      Lab Results   Component Value Date    POTASSIUM 4.1 06/19/2022    POTASSIUM 4.0 01/20/2022    POTASSIUM 5.0 10/16/2019    POTASSIUM 4.0 11/02/2015    POTASSIUM 3.6 07/09/2005    Lab Results   Component Value Date    CO2 27 06/19/2022    CO2 27 01/20/2022    CO2 30 10/16/2019    CO2 26 11/02/2015    CO2 29 07/09/2005    Lab Results   Component Value Date    CR 1.59 06/19/2022    CR 0.70 01/20/2022    CR 0.96 10/16/2019    CR 0.78 11/02/2015    CR 1.00 07/09/2005        Recent Labs   Lab 06/19/22  0518   WBC 5.7   HGB 11.8*   HCT 36.7*   MCV 90         Imaging:   Results for orders placed or performed during the hospital encounter of 01/20/22   XR Chest Port 1 View    Narrative    EXAM: XR CHEST PORT 1 VIEW  LOCATION: [facility]  DATE/TIME: 1/20/2022 6:09 PM    INDICATION: COVID, cough.  COMPARISON: None available.      Impression    IMPRESSION: Single AP view of the chest was obtained. Mild enlargement of the cardiac silhouette. Bilateral basilar and subpleural predominant patchy pulmonary opacities, worrisome for infection including atypical infection like COVID 19. No significant   pleural effusion or pneumothorax.   US Lower Extremity Venous Duplex Bilateral    Narrative    EXAM: US LOWER EXTREMITY VENOUS DUPLEX BILATERAL  LOCATION: [facility]  DATE/TIME: 1/20/2022 7:47 PM    INDICATION: Eval for clot  COMPARISON: None.  TECHNIQUE: Venous Duplex ultrasound of bilateral lower extremities with and without compression, augmentation and duplex. Color flow and spectral Doppler with waveform analysis performed.    FINDINGS: Exam includes the common femoral, femoral, popliteal veins as well as segmentally visualized deep calf veins and greater saphenous vein.     RIGHT: No deep vein thrombosis. No superficial thrombophlebitis. No popliteal cyst.    LEFT: No deep vein thrombosis. No superficial  thrombophlebitis. Small simple Baker's cyst      Impression    IMPRESSION:  1.  No deep venous thrombosis in the bilateral lower extremities.   NM Lung Scan Perfusion Particulate    Narrative    EXAM: NM LUNG SCAN PERFUSION PARTICULATE  LOCATION: [facility]  DATE/TIME: 1/20/2022 8:09 PM    INDICATION: Cough . Covid pneumonia.  COMPARISON: Chest radiograph from 1/20/2022  TECHNIQUE: 3.4 mCi technetium-99m MAA, IV. Standard lung perfusion imaging.    FINDINGS: Nonsegmental perfusion defects in both lungs, corresponding to areas of airspace consolidation on chest radiograph.      Impression    IMPRESSION:   Nondiagnostic with regards to pulmonary emboli

## 2022-06-19 NOTE — PLAN OF CARE
PRIMARY DIAGNOSIS:  RENAL COLIC  OUTPATIENT/OBSERVATION GOALS TO BE MET BEFORE DISCHARGE:  1. Diagnostic test and consults (if applicable) complete: Yes, CT 2 x 2 mm stone    2. Vitals signs stable or return to baseline: Yes    3. Tolerate oral intake to maintain hydration:  NPO, with ice chips    4. Pain status: Patient states that the pain medication is not working for home.     5. Tolerating oral antibiotics or has plans for home infusion setup:  IV Rocephin    6. Return to near baseline physical activity: No    Discharge Planner Nurse   Safe discharge environment identified: Yes  Barriers to discharge: Yes, flank pain.       Entered by: Dania Michaud RN 06/19/2022 11:08 AM    A&Ox4, SBA in room, patient says the pain is not managed with pain medication, provider did increase it. Patient wants to go home if we are un able to do anything for his pain, provider talked to him about this and he has agreed to stay for awhile longer. Patient wants fentanyl or percocet.  He is running on NS at 125mL/hr, still NPO incase of a procedure.        Please review provider order for any additional goals.   Nurse to notify provider when observation goals have been met and patient is ready for discharge.Goal Outcome Evaluation:

## 2022-06-19 NOTE — PLAN OF CARE
PRIMARY DIAGNOSIS:  RENAL COLIC  OUTPATIENT/OBSERVATION GOALS TO BE MET BEFORE DISCHARGE:  1. Diagnostic test and consults (if applicable) complete: Yes, CT 2 x 2 mm stone    2. Vitals signs stable or return to baseline: Yes    3. Tolerate oral intake to maintain hydration:  NPO, with ice chips    4. Pain status: Patient was able to fall asleep after the most recent dosage of pain medication and ativan.     5. Tolerating oral antibiotics or has plans for home infusion setup:  IV Rocephin    6. Return to near baseline physical activity: No    Discharge Planner Nurse   Safe discharge environment identified: Yes  Barriers to discharge: Yes, flank pain.       Entered by: Dania Michaud RN 06/19/2022 3:39 PM    A&Ox4, SBA in room, patient has been sleeping since the last dosage of pain medication, VSS. Patient is scheduled for a cytoscopy and stent placement tonight at 6:30pm. He is still running on NS at 125mL/hr, still has been NPO.     Please review provider order for any additional goals.   Nurse to notify provider when observation goals have been met and patient is ready for discharge.Goal Outcome Evaluation:

## 2022-06-19 NOTE — PROGRESS NOTES
Care Management Discharge Note    Discharge Date: 06/20/2022     Discharge Disposition:  Home    Additional Information:  Pt has a [facility] listed and is identified as a Service Bundle #2. Per chart review, no needs or assessment needed at this time. Please consult CM/SW if discharge needs should arise.    Kerrie Polk RN BSN   Inpatient Care Coordination  [facility]   Phone [phone]

## 2022-06-20 LAB — BACTERIA UR CULT: NO GROWTH

## 2022-06-20 NOTE — ANESTHESIA CARE TRANSFER NOTE
Patient: Jean Paul Gordon    Procedure: Procedure(s):  CYSTOSCOPY, WITH URETERAL STENT INSERTION       Diagnosis: Nephrolithiasis [N20.0]  Diagnosis Additional Information: No value filed.    Anesthesia Type:   General     Note:    Oropharynx: oropharynx clear of all foreign objects  Level of Consciousness: drowsy  Oxygen Supplementation: face mask    Independent Airway: airway patency satisfactory and stable  Dentition: dentition unchanged  Vital Signs Stable: post-procedure vital signs reviewed and stable  Report to RN Given: handoff report given  Patient transferred to: PACU    Handoff Report: Identifed the Patient, Identified the Reponsible Provider, Reviewed the pertinent medical history, Discussed the surgical course, Reviewed Intra-OP anesthesia mangement and issues during anesthesia, Set expectations for post-procedure period and Allowed opportunity for questions and acknowledgement of understanding      Vitals:  Vitals Value Taken Time   /72 06/19/22 2130   Temp     Pulse 60 06/19/22 2132   Resp 9 06/19/22 2132   SpO2 100 % 06/19/22 2132   Vitals shown include unvalidated device data.    Electronically Signed By: SANCHO Stahl CRNA  June 19, 2022  9:34 PM

## 2022-06-20 NOTE — OP NOTE
DATE OF SERVICE: 06/19/22   PREOPERATIVE DIAGNOSIS: Left ureteral Stone  POSTOPERATIVE DIAGNOSIS: left ureteral Stone    PROCEDURES PERFORMED:   1. Cystourethroscopy  2. Left retrograde pyelography with interpretation of intraoperative fluoroscopic imaging  3. Left ureteral stent placement    STAFF SURGEON: Haroon Chavez MD  ANESTHESIA: General    ESTIMATED BLOOD LOSS: 1 cc  DRAINS/TUBES:  6 Bangladeshi x 26 cm double-J ureteral stent     COMPLICATIONS: None.   SPECIMEN: Renal pelvis fluid for culture  SIGNIFICANT FINDINGS: no clear filling defect some resistance at distal ureter, moderate hydronephrosis urine drip slightly cloudy     BRIEF OPERATIVE INDICATIONS: Jean Paul Gordon is a 72 year old male with obstructing left ureteral stone. After a discussion of all risks, benefits, and alternatives, the patient elected to proceed with ureteral stent placement. The patient understands the need for more than one procedure to eliminate stone burden.      DESCRIPTION OF PROCEDURE:  After informed consent was verified, the patient was transported to the operating room, placed supine on the table. After adequate anesthesia was induced, he was placed in dorsal lithotomy and prepped and draped in the usual sterile fashion. A timeout was taken to confirm correct patient, procedure and laterality. Pre-operative IV antibiotics were administered.    A 22 Bangladeshi rigid cystoscope was inserted per a well-lubricated urethra. The anterior urethra was unremarkable. The ureteral orifices were orthotopic bilaterally. The left ureteral orifice was identified and cannulated with a Sensor wire and 6-Fr open-ended catheter. The wire passed without resistance into the upper pole. A sample of renal pelvis fluid was collected for culture. A retrograde pyelogram showed moderate hydronephrosis, but no other filling defects.  A 6 Fr x 26 cm double-J stent was advanced over the Sensor wire, and a good proximal curl was seen in the renal pelvis and  the distal curl was seen in the bladder. The bladder was drained.   The patient tolerated the procedure well.  No apparent complications. He was transported to the postanesthesia care unit in stable condition.      PLAN: Patient to be scheduled for outpatient stone management in the coming weeks.    Haroon Chavez MD   Department of Urology   [facility]

## 2022-06-20 NOTE — ANESTHESIA PREPROCEDURE EVALUATION
"Anesthesia Pre-Procedure Evaluation    Patient: Jean Paul Gordon   MRN: [id number] : 1950        Procedure : Procedure(s):  CYSTOSCOPY, WITH URETERAL STENT INSERTION          Past Medical History:   Diagnosis Date     Anxiety state, unspecified      Hypertension      Myocarditis (H)      Narcolepsy       History reviewed. No pertinent surgical history.   Allergies   Allergen Reactions     Methylphenidate Shortness Of Breath     Other reaction(s): Stomach Upset     Armodafinil      Other reaction(s): Insomnia  Anorexia     Contrast Dye      Patient unsure of what happens for allergy, but states \"I know I am allergic.\"      Social History     Tobacco Use     Smoking status: Never Smoker     Smokeless tobacco: Never Used   Substance Use Topics     Alcohol use: No      Wt Readings from Last 1 Encounters:   22 95.4 kg (210 lb 4.8 oz)        Anesthesia Evaluation   Pt has had prior anesthetic. Type: General.    No history of anesthetic complications       ROS/MED HX  ENT/Pulmonary:  - neg pulmonary ROS     Neurologic: Comment: narcolepsy      Cardiovascular:     (+) Dyslipidemia hypertension-----    METS/Exercise Tolerance:     Hematologic:  - neg hematologic  ROS     Musculoskeletal:  - neg musculoskeletal ROS     GI/Hepatic:  - neg GI/hepatic ROS     Renal/Genitourinary:     (+) renal disease, type: ARF, Nephrolithiasis ,     Endo:  - neg endo ROS     Psychiatric/Substance Use:  - neg psychiatric ROS     Infectious Disease:  - neg infectious disease ROS     Malignancy:  - neg malignancy ROS     Other:  - neg other ROS          Physical Exam    Airway        Mallampati: III   TM distance: > 3 FB   Neck ROM: full   Mouth opening: > 3 cm    Respiratory Devices and Support         Dental       (+) missing      Cardiovascular   cardiovascular exam normal          Pulmonary   pulmonary exam normal                OUTSIDE LABS:  CBC:   Lab Results   Component Value Date    WBC 5.7 2022    WBC 8.5 2022 "    HGB 11.8 (L) 06/19/2022    HGB 11.6 (L) 01/20/2022    HCT 36.7 (L) 06/19/2022    HCT 36.0 (L) 01/20/2022     06/19/2022     01/20/2022     BMP:   Lab Results   Component Value Date     06/19/2022     01/20/2022    POTASSIUM 4.1 06/19/2022    POTASSIUM 4.0 01/20/2022    CHLORIDE 108 06/19/2022    CHLORIDE 105 01/20/2022    CO2 27 06/19/2022    CO2 27 01/20/2022    BUN 23 06/19/2022    BUN 19 01/20/2022    CR 1.59 (H) 06/19/2022    CR 0.70 01/20/2022    GLC 84 06/19/2022     (H) 01/20/2022     COAGS: No results found for: PTT, INR, FIBR  POC: No results found for: BGM, HCG, HCGS  HEPATIC:   Lab Results   Component Value Date    ALBUMIN 3.9 10/16/2019    PROTTOTAL 8.0 10/16/2019    ALT 32 10/16/2019    AST 40 10/16/2019    ALKPHOS 96 10/16/2019    BILITOTAL 0.9 10/16/2019     OTHER:   Lab Results   Component Value Date    LACT 1.1 11/02/2015    NORIS 9.0 06/19/2022    LIPASE 409 (H) 11/02/2015       Anesthesia Plan    ASA Status:  3   NPO Status:  NPO Appropriate    Anesthesia Type: General.     - Airway: LMA   Induction: Intravenous, Propofol.   Maintenance: Balanced.        Consents    Anesthesia Plan(s) and associated risks, benefits, and realistic alternatives discussed. Questions answered and patient/representative(s) expressed understanding.    - Discussed:     - Discussed with:  Patient         Postoperative Care    Pain management: IV analgesics, Oral pain medications, Multi-modal analgesia.   PONV prophylaxis: Ondansetron (or other 5HT-3), Dexamethasone or Solumedrol     Comments:                Stevan Ferris MD

## 2022-06-20 NOTE — DISCHARGE INSTRUCTIONS
CYSTOSCOPY DISCHARGE INSTRUCTIONS  [facility]  MARY PARTIDA, KHUSHI, RACHEL, EDGAR & CHRISTINE  [phone]    YOU MAY GO BACK TO YOUR NORMAL DIET AND ACTIVITY, UNLESS YOUR DOCTOR TELLS YOU NOT TO.    FOR THE NEXT TWO DAYS, YOU MAY NOTICE:    SOME BLOOD IN YOUR URINE.  SOME BURNING WHEN YOU URINATE.  AN URGE TO URINATE MORE OFTEN.  BLADDER SPASMS.    THESE ARE NORMAL AFTER THE PROCEDURE.  THEY SHOULD GO AWAY AFTER A DAY OR TWO.  TO RELIEVE THESE PROBLEMS:     DRINK 6 TO 8 LARGE GLASSES OF WATER EACH DAY (INCLUDES DRINKS AT MEALS).  THIS WILL HELP CLEAR THE URINE.    TAKE WARM BATHS TO RELIEVE PAIN AND BLADDER SPASMS.  DO NOT ADD ANYTHING TO THE BATH WATER.    YOUR DOCTOR MAY PRESCRIBE PAIN MEDICINE.  YOU MAY ALSO TAKE TYLENOL (ACETAMINOPHEN) FOR PAIN.    CALL YOUR SURGEON IF YOU HAVE:    A FEVER OVER 101 DEGREES.  CHECK YOUR TEMPERATURE UNDER YOUR TONGUE.    CHILLS.    FAILURE TO URINATE (NO URINE COMES OUT WHEN YOU TRY TO USE THE TOILET).  TRY SOAKING IN A BATHTUB FULL OF WARM WATER.  IF STILL NO URINE, CALL YOUR DOCTOR.    A LOT OF BLOOD IN THE URINE, OR BLOOD CLOTS LARGER THAN A NICKEL.      PAIN IN THE BACK OR BELLY AREA (ABDOMEN).    PAIN OR SPASMS THAT ARE NOT RELIEVED BY WARM TUB BATHS AND PAIN MEDICINE.      SEVERE PAIN, BURNING OR OTHER PROBLEMS WHILE PASSING URINE.    PAIN THAT GETS WORSE AFTER TWO DAYS.     Call to make an appointment for 2 weeks with Dr Hart.   Strain your urine if any stones collected put into container provided and bring with your next appointment.        GENERAL ANESTHESIA OR SEDATION ADULT DISCHARGE INSTRUCTIONS   SPECIAL PRECAUTIONS FOR 24 HOURS AFTER SURGERY    IT IS NOT UNUSUAL TO FEEL LIGHT-HEADED OR FAINT, UP TO 24 HOURS AFTER SURGERY OR WHILE TAKING PAIN MEDICATION.  IF YOU HAVE THESE SYMPTOMS; SIT FOR A FEW MINUTES BEFORE STANDING AND HAVE SOMEONE ASSIST YOU WHEN YOU GET UP TO WALK OR USE THE BATHROOM.    YOU SHOULD REST AND RELAX FOR THE NEXT 24 HOURS AND YOU  MUST MAKE ARRANGEMENTS TO HAVE SOMEONE STAY WITH YOU FOR AT LEAST 24 HOURS AFTER YOUR DISCHARGE.  AVOID HAZARDOUS AND STRENUOUS ACTIVITIES.  DO NOT MAKE IMPORTANT DECISIONS FOR 24 HOURS.    DO NOT DRIVE ANY VEHICLE OR OPERATE MECHANICAL EQUIPMENT FOR 24 HOURS FOLLOWING THE END OF YOUR SURGERY.  EVEN THOUGH YOU MAY FEEL NORMAL, YOUR REACTIONS MAY BE AFFECTED BY THE MEDICATION YOU HAVE RECEIVED.    DO NOT DRINK ALCOHOLIC BEVERAGES FOR 24 HOURS FOLLOWING YOUR SURGERY.    DRINK CLEAR LIQUIDS (APPLE JUICE, GINGER ALE, 7-UP, BROTH, ETC.).  PROGRESS TO YOUR REGULAR DIET AS YOU FEEL ABLE.    YOU MAY HAVE A DRY MOUTH, A SORE THROAT, MUSCLES ACHES OR TROUBLE SLEEPING.  THESE SHOULD GO AWAY AFTER 24 HOURS.    CALL YOUR DOCTOR FOR ANY OF THE FOLLOWING:  SIGNS OF INFECTION (FEVER, GROWING TENDERNESS AT THE SURGERY SITE, A LARGE AMOUNT OF DRAINAGE OR BLEEDING, SEVERE PAIN, FOUL-SMELLING DRAINAGE, REDNESS OR SWELLING.    IT HAS BEEN OVER 8 TO 10 HOURS SINCE SURGERY AND YOU ARE STILL NOT ABLE TO URINATE (PASS WATER).    You received Toradol, an IV form of Ibuprofen (Motrin) at 9:20 PM.  Do not take any Ibuprofen products until 3:20 AM.

## 2022-06-20 NOTE — ADDENDUM NOTE
Addendum  created 06/19/22 2219 by Stevan Ferris MD    Order list changed, Order sets accessed

## 2022-06-20 NOTE — ANESTHESIA POSTPROCEDURE EVALUATION
Patient: Jean Paul Gordon    Procedure: Procedure(s):  CYSTOSCOPY, WITH URETERAL STENT INSERTION       Anesthesia Type:  General    Note:     Postop Pain Control: Uneventful            Sign Out: Well controlled pain   PONV: No   Neuro/Psych: Uneventful            Sign Out: Acceptable/Baseline neuro status   Airway/Respiratory: Uneventful            Sign Out: Acceptable/Baseline resp. status   CV/Hemodynamics: Uneventful            Sign Out: Acceptable CV status   Other NRE: NONE   DID A NON-ROUTINE EVENT OCCUR? No           Last vitals:  Vitals Value Taken Time   /73 06/19/22 2150   Temp     Pulse 59 06/19/22 2155   Resp 9 06/19/22 2155   SpO2 100 % 06/19/22 2155   Vitals shown include unvalidated device data.    Electronically Signed By: Stevan Ferris MD  June 19, 2022  9:57 PM

## 2022-06-21 ENCOUNTER — TELEPHONE (OUTPATIENT)
Dept: UROLOGY | Facility: CLINIC | Age: 72
End: 2022-06-21

## 2022-06-21 LAB — BACTERIA UR CULT: NO GROWTH

## 2022-06-21 NOTE — TELEPHONE ENCOUNTER
Patient left a VM @ [facility]  Asking for a call back . Has stent in place  And having pain with urination . Called patient   back no answer and VM box is full . Will route message to Dr Chavez's  Office to follow up with

## 2022-06-22 ENCOUNTER — TELEPHONE (OUTPATIENT)
Dept: UROLOGY | Facility: CLINIC | Age: 72
End: 2022-06-22

## 2022-06-22 DIAGNOSIS — N32.89 BLADDER SPASM: Primary | ICD-10-CM

## 2022-06-22 RX ORDER — TAMSULOSIN HYDROCHLORIDE 0.4 MG/1
0.4 CAPSULE ORAL DAILY
Qty: 30 CAPSULE | Refills: 11 | Status: SHIPPED | OUTPATIENT
Start: 2022-06-22

## 2022-06-22 RX ORDER — OXYBUTYNIN CHLORIDE 5 MG/1
5 TABLET ORAL 3 TIMES DAILY
Qty: 30 TABLET | Refills: 11 | Status: SHIPPED | OUTPATIENT
Start: 2022-06-22 | End: 2022-06-22

## 2022-06-22 RX ORDER — OXYBUTYNIN CHLORIDE 5 MG/1
5 TABLET ORAL 3 TIMES DAILY
Qty: 30 TABLET | Refills: 11 | Status: SHIPPED | OUTPATIENT
Start: 2022-06-22

## 2022-06-22 RX ORDER — TAMSULOSIN HYDROCHLORIDE 0.4 MG/1
0.4 CAPSULE ORAL DAILY
Qty: 30 CAPSULE | Refills: 11 | Status: SHIPPED | OUTPATIENT
Start: 2022-06-22 | End: 2022-06-22

## 2022-06-22 NOTE — TELEPHONE ENCOUNTER
Call center called with pt on the line. Pt is in pain, seeking pain meds. It looks as though Dr Chavez sent meds for bladder spasms, pt is to  and take those then message back/call back. uro on call phone was provided for after hours phone calls.

## 2022-06-23 LAB — BACTERIA UR CULT: NO GROWTH

## 2022-06-29 DIAGNOSIS — N20.1 LEFT URETERAL STONE: Primary | ICD-10-CM

## 2022-07-07 ENCOUNTER — ANESTHESIA (OUTPATIENT)
Dept: SURGERY | Facility: CLINIC | Age: 72
End: 2022-07-07
Payer: MEDICARE

## 2022-07-07 ENCOUNTER — HOSPITAL ENCOUNTER (OUTPATIENT)
Facility: CLINIC | Age: 72
Discharge: HOME | End: 2022-07-07
Attending: UROLOGY | Admitting: UROLOGY
Payer: MEDICARE

## 2022-07-07 ENCOUNTER — ANESTHESIA EVENT (OUTPATIENT)
Dept: SURGERY | Facility: CLINIC | Age: 72
End: 2022-07-07
Payer: MEDICARE

## 2022-07-07 ENCOUNTER — APPOINTMENT (OUTPATIENT)
Dept: GENERAL RADIOLOGY | Facility: CLINIC | Age: 72
End: 2022-07-07
Attending: UROLOGY
Payer: MEDICARE

## 2022-07-07 VITALS
SYSTOLIC BLOOD PRESSURE: 144 MMHG | OXYGEN SATURATION: 97 % | TEMPERATURE: 98 F | BODY MASS INDEX: 29.26 KG/M2 | RESPIRATION RATE: 18 BRPM | WEIGHT: 203.9 LBS | HEART RATE: 85 BPM | DIASTOLIC BLOOD PRESSURE: 84 MMHG

## 2022-07-07 DIAGNOSIS — N20.0 NEPHROLITHIASIS: Primary | ICD-10-CM

## 2022-07-07 DIAGNOSIS — N17.9 AKI (ACUTE KIDNEY INJURY) (H): ICD-10-CM

## 2022-07-07 LAB — SARS-COV-2 RNA RESP QL NAA+PROBE: NEGATIVE

## 2022-07-07 PROCEDURE — 250N000009 HC RX 250: Performed by: NURSE ANESTHETIST, CERTIFIED REGISTERED

## 2022-07-07 PROCEDURE — 82365 CALCULUS SPECTROSCOPY: CPT | Performed by: UROLOGY

## 2022-07-07 PROCEDURE — 710N000009 HC RECOVERY PHASE 1, LEVEL 1, PER MIN: Performed by: UROLOGY

## 2022-07-07 PROCEDURE — 250N000025 HC SEVOFLURANE, PER MIN: Performed by: UROLOGY

## 2022-07-07 PROCEDURE — 370N000017 HC ANESTHESIA TECHNICAL FEE, PER MIN: Performed by: UROLOGY

## 2022-07-07 PROCEDURE — U0003 INFECTIOUS AGENT DETECTION BY NUCLEIC ACID (DNA OR RNA); SEVERE ACUTE RESPIRATORY SYNDROME CORONAVIRUS 2 (SARS-COV-2) (CORONAVIRUS DISEASE [COVID-19]), AMPLIFIED PROBE TECHNIQUE, MAKING USE OF HIGH THROUGHPUT TECHNOLOGIES AS DESCRIBED BY CMS-2020-01-R: HCPCS | Performed by: UROLOGY

## 2022-07-07 PROCEDURE — 52352 CYSTOURETERO W/STONE REMOVE: CPT | Mod: LT | Performed by: UROLOGY

## 2022-07-07 PROCEDURE — 258N000001 HC RX 258: Performed by: UROLOGY

## 2022-07-07 PROCEDURE — 272N000001 HC OR GENERAL SUPPLY STERILE: Performed by: UROLOGY

## 2022-07-07 PROCEDURE — 710N000012 HC RECOVERY PHASE 2, PER MINUTE: Performed by: UROLOGY

## 2022-07-07 PROCEDURE — C2617 STENT, NON-COR, TEM W/O DEL: HCPCS | Performed by: UROLOGY

## 2022-07-07 PROCEDURE — 258N000003 HC RX IP 258 OP 636: Performed by: NURSE ANESTHETIST, CERTIFIED REGISTERED

## 2022-07-07 PROCEDURE — 999N000179 XR SURGERY CARM FLUORO LESS THAN 5 MIN W STILLS

## 2022-07-07 PROCEDURE — 250N000011 HC RX IP 250 OP 636: Performed by: ANESTHESIOLOGY

## 2022-07-07 PROCEDURE — 250N000011 HC RX IP 250 OP 636: Performed by: UROLOGY

## 2022-07-07 PROCEDURE — 999N000141 HC STATISTIC PRE-PROCEDURE NURSING ASSESSMENT: Performed by: UROLOGY

## 2022-07-07 PROCEDURE — 250N000009 HC RX 250: Performed by: UROLOGY

## 2022-07-07 PROCEDURE — 360N000075 HC SURGERY LEVEL 2, PER MIN: Performed by: UROLOGY

## 2022-07-07 PROCEDURE — 52332 CYSTOSCOPY AND TREATMENT: CPT | Mod: LT | Performed by: UROLOGY

## 2022-07-07 PROCEDURE — 250N000011 HC RX IP 250 OP 636: Performed by: NURSE ANESTHETIST, CERTIFIED REGISTERED

## 2022-07-07 PROCEDURE — 250N000013 HC RX MED GY IP 250 OP 250 PS 637: Performed by: UROLOGY

## 2022-07-07 DEVICE — URETERAL STENT
Type: IMPLANTABLE DEVICE | Site: URETER | Status: FUNCTIONAL
Brand: POLARIS™ ULTRA

## 2022-07-07 RX ORDER — ONDANSETRON 4 MG/1
4 TABLET, ORALLY DISINTEGRATING ORAL EVERY 30 MIN PRN
Status: DISCONTINUED | OUTPATIENT
Start: 2022-07-07 | End: 2022-07-07 | Stop reason: HOSPADM

## 2022-07-07 RX ORDER — EPHEDRINE SULFATE 50 MG/ML
INJECTION, SOLUTION INTRAMUSCULAR; INTRAVENOUS; SUBCUTANEOUS PRN
Status: DISCONTINUED | OUTPATIENT
Start: 2022-07-07 | End: 2022-07-07

## 2022-07-07 RX ORDER — KETOROLAC TROMETHAMINE 15 MG/ML
15 INJECTION, SOLUTION INTRAMUSCULAR; INTRAVENOUS ONCE
Status: DISCONTINUED | OUTPATIENT
Start: 2022-07-07 | End: 2022-07-07 | Stop reason: HOSPADM

## 2022-07-07 RX ORDER — OXYCODONE HYDROCHLORIDE 5 MG/1
5 TABLET ORAL EVERY 4 HOURS PRN
Status: DISCONTINUED | OUTPATIENT
Start: 2022-07-07 | End: 2022-07-07 | Stop reason: HOSPADM

## 2022-07-07 RX ORDER — MEPERIDINE HYDROCHLORIDE 25 MG/ML
12.5 INJECTION INTRAMUSCULAR; INTRAVENOUS; SUBCUTANEOUS EVERY 5 MIN PRN
Status: DISCONTINUED | OUTPATIENT
Start: 2022-07-07 | End: 2022-07-07 | Stop reason: HOSPADM

## 2022-07-07 RX ORDER — MAGNESIUM HYDROXIDE 1200 MG/15ML
LIQUID ORAL PRN
Status: DISCONTINUED | OUTPATIENT
Start: 2022-07-07 | End: 2022-07-07 | Stop reason: HOSPADM

## 2022-07-07 RX ORDER — KETOROLAC TROMETHAMINE 15 MG/ML
15 INJECTION, SOLUTION INTRAMUSCULAR; INTRAVENOUS
Status: COMPLETED | OUTPATIENT
Start: 2022-07-07 | End: 2022-07-07

## 2022-07-07 RX ORDER — CEFAZOLIN SODIUM/WATER 2 G/20 ML
2 SYRINGE (ML) INTRAVENOUS SEE ADMIN INSTRUCTIONS
Status: DISCONTINUED | OUTPATIENT
Start: 2022-07-07 | End: 2022-07-07 | Stop reason: HOSPADM

## 2022-07-07 RX ORDER — DEXAMETHASONE SODIUM PHOSPHATE 4 MG/ML
INJECTION, SOLUTION INTRA-ARTICULAR; INTRALESIONAL; INTRAMUSCULAR; INTRAVENOUS; SOFT TISSUE PRN
Status: DISCONTINUED | OUTPATIENT
Start: 2022-07-07 | End: 2022-07-07

## 2022-07-07 RX ORDER — ONDANSETRON 2 MG/ML
4 INJECTION INTRAMUSCULAR; INTRAVENOUS EVERY 30 MIN PRN
Status: DISCONTINUED | OUTPATIENT
Start: 2022-07-07 | End: 2022-07-07 | Stop reason: HOSPADM

## 2022-07-07 RX ORDER — GLYCOPYRROLATE 0.2 MG/ML
INJECTION, SOLUTION INTRAMUSCULAR; INTRAVENOUS PRN
Status: DISCONTINUED | OUTPATIENT
Start: 2022-07-07 | End: 2022-07-07

## 2022-07-07 RX ORDER — SODIUM CHLORIDE, SODIUM LACTATE, POTASSIUM CHLORIDE, CALCIUM CHLORIDE 600; 310; 30; 20 MG/100ML; MG/100ML; MG/100ML; MG/100ML
INJECTION, SOLUTION INTRAVENOUS CONTINUOUS
Status: DISCONTINUED | OUTPATIENT
Start: 2022-07-07 | End: 2022-07-07 | Stop reason: HOSPADM

## 2022-07-07 RX ORDER — PROPOFOL 10 MG/ML
INJECTION, EMULSION INTRAVENOUS CONTINUOUS PRN
Status: DISCONTINUED | OUTPATIENT
Start: 2022-07-07 | End: 2022-07-07

## 2022-07-07 RX ORDER — OXYCODONE HYDROCHLORIDE 5 MG/1
10 TABLET ORAL EVERY 4 HOURS PRN
Status: DISCONTINUED | OUTPATIENT
Start: 2022-07-07 | End: 2022-07-07

## 2022-07-07 RX ORDER — CEFAZOLIN SODIUM/WATER 2 G/20 ML
2 SYRINGE (ML) INTRAVENOUS
Status: COMPLETED | OUTPATIENT
Start: 2022-07-07 | End: 2022-07-07

## 2022-07-07 RX ORDER — ACETAMINOPHEN 325 MG/1
650 TABLET ORAL EVERY 4 HOURS PRN
Qty: 60 TABLET | Refills: 0 | Status: SHIPPED | OUTPATIENT
Start: 2022-07-07

## 2022-07-07 RX ORDER — LIDOCAINE HYDROCHLORIDE 20 MG/ML
INJECTION, SOLUTION INFILTRATION; PERINEURAL PRN
Status: DISCONTINUED | OUTPATIENT
Start: 2022-07-07 | End: 2022-07-07

## 2022-07-07 RX ORDER — LABETALOL HYDROCHLORIDE 5 MG/ML
10 INJECTION, SOLUTION INTRAVENOUS
Status: DISCONTINUED | OUTPATIENT
Start: 2022-07-07 | End: 2022-07-07 | Stop reason: HOSPADM

## 2022-07-07 RX ORDER — DEXMEDETOMIDINE HYDROCHLORIDE 4 UG/ML
INJECTION, SOLUTION INTRAVENOUS PRN
Status: DISCONTINUED | OUTPATIENT
Start: 2022-07-07 | End: 2022-07-07

## 2022-07-07 RX ORDER — ONDANSETRON 2 MG/ML
INJECTION INTRAMUSCULAR; INTRAVENOUS PRN
Status: DISCONTINUED | OUTPATIENT
Start: 2022-07-07 | End: 2022-07-07

## 2022-07-07 RX ORDER — PROPOFOL 10 MG/ML
INJECTION, EMULSION INTRAVENOUS PRN
Status: DISCONTINUED | OUTPATIENT
Start: 2022-07-07 | End: 2022-07-07

## 2022-07-07 RX ORDER — FENTANYL CITRATE 50 UG/ML
INJECTION, SOLUTION INTRAMUSCULAR; INTRAVENOUS PRN
Status: DISCONTINUED | OUTPATIENT
Start: 2022-07-07 | End: 2022-07-07

## 2022-07-07 RX ORDER — HYDROMORPHONE HCL IN WATER/PF 6 MG/30 ML
0.4 PATIENT CONTROLLED ANALGESIA SYRINGE INTRAVENOUS EVERY 5 MIN PRN
Status: DISCONTINUED | OUTPATIENT
Start: 2022-07-07 | End: 2022-07-07 | Stop reason: HOSPADM

## 2022-07-07 RX ORDER — OXYCODONE HYDROCHLORIDE 5 MG/1
5 TABLET ORAL EVERY 6 HOURS PRN
Qty: 6 TABLET | Refills: 0 | Status: SHIPPED | OUTPATIENT
Start: 2022-07-07

## 2022-07-07 RX ORDER — FENTANYL CITRATE 0.05 MG/ML
50 INJECTION, SOLUTION INTRAMUSCULAR; INTRAVENOUS EVERY 5 MIN PRN
Status: DISCONTINUED | OUTPATIENT
Start: 2022-07-07 | End: 2022-07-07 | Stop reason: HOSPADM

## 2022-07-07 RX ORDER — SODIUM CHLORIDE, SODIUM LACTATE, POTASSIUM CHLORIDE, CALCIUM CHLORIDE 600; 310; 30; 20 MG/100ML; MG/100ML; MG/100ML; MG/100ML
INJECTION, SOLUTION INTRAVENOUS CONTINUOUS PRN
Status: DISCONTINUED | OUTPATIENT
Start: 2022-07-07 | End: 2022-07-07

## 2022-07-07 RX ORDER — LIDOCAINE HYDROCHLORIDE 20 MG/ML
JELLY TOPICAL PRN
Status: DISCONTINUED | OUTPATIENT
Start: 2022-07-07 | End: 2022-07-07 | Stop reason: HOSPADM

## 2022-07-07 RX ADMIN — ONDANSETRON 4 MG: 2 INJECTION INTRAMUSCULAR; INTRAVENOUS at 14:22

## 2022-07-07 RX ADMIN — GLYCOPYRROLATE 0.2 MG: 0.2 INJECTION, SOLUTION INTRAMUSCULAR; INTRAVENOUS at 13:57

## 2022-07-07 RX ADMIN — PHENYLEPHRINE HYDROCHLORIDE 100 MCG: 10 INJECTION INTRAVENOUS at 13:54

## 2022-07-07 RX ADMIN — MIDAZOLAM 1 MG: 1 INJECTION INTRAMUSCULAR; INTRAVENOUS at 13:36

## 2022-07-07 RX ADMIN — Medication 2 G: at 13:29

## 2022-07-07 RX ADMIN — LIDOCAINE HYDROCHLORIDE 80 MG: 20 INJECTION, SOLUTION INFILTRATION; PERINEURAL at 13:38

## 2022-07-07 RX ADMIN — Medication 5 MG: at 13:53

## 2022-07-07 RX ADMIN — FENTANYL CITRATE 50 MCG: 50 INJECTION, SOLUTION INTRAMUSCULAR; INTRAVENOUS at 15:21

## 2022-07-07 RX ADMIN — PROPOFOL 140 MG: 10 INJECTION, EMULSION INTRAVENOUS at 13:38

## 2022-07-07 RX ADMIN — OXYCODONE HYDROCHLORIDE 5 MG: 5 TABLET ORAL at 16:33

## 2022-07-07 RX ADMIN — FENTANYL CITRATE 50 MCG: 50 INJECTION, SOLUTION INTRAMUSCULAR; INTRAVENOUS at 15:02

## 2022-07-07 RX ADMIN — FENTANYL CITRATE 25 MCG: 50 INJECTION, SOLUTION INTRAMUSCULAR; INTRAVENOUS at 14:11

## 2022-07-07 RX ADMIN — FENTANYL CITRATE 50 MCG: 50 INJECTION, SOLUTION INTRAMUSCULAR; INTRAVENOUS at 13:38

## 2022-07-07 RX ADMIN — FENTANYL CITRATE 50 MCG: 50 INJECTION, SOLUTION INTRAMUSCULAR; INTRAVENOUS at 15:41

## 2022-07-07 RX ADMIN — SODIUM CHLORIDE, POTASSIUM CHLORIDE, SODIUM LACTATE AND CALCIUM CHLORIDE: 600; 310; 30; 20 INJECTION, SOLUTION INTRAVENOUS at 13:29

## 2022-07-07 RX ADMIN — Medication 10 MG: at 13:50

## 2022-07-07 RX ADMIN — DEXAMETHASONE SODIUM PHOSPHATE 4 MG: 4 INJECTION, SOLUTION INTRA-ARTICULAR; INTRALESIONAL; INTRAMUSCULAR; INTRAVENOUS; SOFT TISSUE at 13:53

## 2022-07-07 RX ADMIN — DEXMEDETOMIDINE HYDROCHLORIDE 8 MCG: 200 INJECTION INTRAVENOUS at 14:16

## 2022-07-07 RX ADMIN — FENTANYL CITRATE 25 MCG: 50 INJECTION, SOLUTION INTRAMUSCULAR; INTRAVENOUS at 14:05

## 2022-07-07 RX ADMIN — KETOROLAC TROMETHAMINE 15 MG: 15 INJECTION, SOLUTION INTRAMUSCULAR; INTRAVENOUS at 15:50

## 2022-07-07 RX ADMIN — PROPOFOL 20 MCG/KG/MIN: 10 INJECTION, EMULSION INTRAVENOUS at 13:38

## 2022-07-07 ASSESSMENT — ENCOUNTER SYMPTOMS
SEIZURES: 0
DYSRHYTHMIAS: 0

## 2022-07-07 ASSESSMENT — LIFESTYLE VARIABLES: TOBACCO_USE: 0

## 2022-07-07 NOTE — ANESTHESIA POSTPROCEDURE EVALUATION
Patient: Jean Paul Gordon    Procedure: Procedure(s):  CYSTOSCOPY LEFT URETEROSCOPY, STONE BASKETING, WITH LEFT STENT EXCHANGE     Anesthesia Type:  General    Note:  Disposition: Outpatient   Postop Pain Control: Uneventful            Sign Out: Well controlled pain   PONV: No   Neuro/Psych: Uneventful            Sign Out: Acceptable/Baseline neuro status   Airway/Respiratory: Uneventful            Sign Out: Acceptable/Baseline resp. status   CV/Hemodynamics: Uneventful            Sign Out: Acceptable CV status; No obvious hypovolemia; No obvious fluid overload   Other NRE: NONE   DID A NON-ROUTINE EVENT OCCUR? No           Last vitals:  Vitals Value Taken Time   /86 07/07/22 1600   Temp 36.6  C (97.8  F) 07/07/22 1438   Pulse 78 07/07/22 1602   Resp 10 07/07/22 1602   SpO2 97 % 07/07/22 1602   Vitals shown include unvalidated device data.    Electronically Signed By: Ivan Summers DO  July 7, 2022  4:39 PM

## 2022-07-07 NOTE — ANESTHESIA PREPROCEDURE EVALUATION
"Anesthesia Pre-Procedure Evaluation    Patient: Jean Paul Gordon   MRN: [id number] : 1950        Procedure : Procedure(s):  CYSTOURETEROSCOPY, WITH RETROGRADE PYELOGRAM, HOLMIUM LASER LITHOTRIPSY OF URETERAL CALCULUS, AND STENT INSERTION          Past Medical History:   Diagnosis Date     Acute kidney injury (H)      Anxiety state, unspecified      Dyslipidemia      Hypertension      Myocarditis (H)      Narcolepsy      Renal colic      Ureteral stone       Past Surgical History:   Procedure Laterality Date     COMBINED CYSTOSCOPY, INSERT STENT URETER(S) Left 2022    Procedure: 1. Cystourethroscopy 2. Left retrograde pyelography with interpretation of intraoperative fluoroscopic imaging 3. Left ureteral stent placement;  Surgeon: Haroon Chavez MD;  Location: [facility] OR      Allergies   Allergen Reactions     Methylphenidate Shortness Of Breath     Other reaction(s): Stomach Upset     Armodafinil      Other reaction(s): Insomnia  Anorexia     Contrast Dye      Patient unsure of what happens for allergy, but states \"I know I am allergic.\"      Social History     Tobacco Use     Smoking status: Never Smoker     Smokeless tobacco: Never Used   Substance Use Topics     Alcohol use: No      Wt Readings from Last 1 Encounters:   22 92.5 kg (203 lb 14.4 oz)        Anesthesia Evaluation   Pt has had prior anesthetic.     No history of anesthetic complications       ROS/MED HX  ENT/Pulmonary:    (-) tobacco use, asthma, sleep apnea and MARLEE risk factors   Neurologic: Comment: Narcolepsy   (-) no seizures, no CVA and migraines   Cardiovascular: Comment: Myocarditis ~10+ years ago.    (+) Dyslipidemia hypertension----- (-) CHF, arrhythmias and irregular heartbeat/palpitations   METS/Exercise Tolerance:     Hematologic:       Musculoskeletal:       GI/Hepatic:    (-) GERD and liver disease   Renal/Genitourinary:     (+) renal disease, Nephrolithiasis ,     Endo:     (+) Obesity,  (-) Type II DM and thyroid disease "   Psychiatric/Substance Use:       Infectious Disease:       Malignancy:       Other:            Physical Exam    Airway        Mallampati: II   TM distance: > 3 FB   Neck ROM: full   Mouth opening: > 3 cm    Respiratory Devices and Support         Dental  no notable dental history     (+) missing      Cardiovascular   cardiovascular exam normal          Pulmonary   pulmonary exam normal        breath sounds clear to auscultation           OUTSIDE LABS:  CBC:   Lab Results   Component Value Date    WBC 5.7 06/19/2022    WBC 8.5 01/20/2022    HGB 11.8 (L) 06/19/2022    HGB 11.6 (L) 01/20/2022    HCT 36.7 (L) 06/19/2022    HCT 36.0 (L) 01/20/2022     06/19/2022     01/20/2022     BMP:   Lab Results   Component Value Date     06/19/2022     01/20/2022    POTASSIUM 4.1 06/19/2022    POTASSIUM 4.0 01/20/2022    CHLORIDE 108 06/19/2022    CHLORIDE 105 01/20/2022    CO2 27 06/19/2022    CO2 27 01/20/2022    BUN 23 06/19/2022    BUN 19 01/20/2022    CR 1.59 (H) 06/19/2022    CR 0.70 01/20/2022    GLC 84 06/19/2022     (H) 01/20/2022     COAGS: No results found for: PTT, INR, FIBR  POC: No results found for: BGM, HCG, HCGS  HEPATIC:   Lab Results   Component Value Date    ALBUMIN 3.9 10/16/2019    PROTTOTAL 8.0 10/16/2019    ALT 32 10/16/2019    AST 40 10/16/2019    ALKPHOS 96 10/16/2019    BILITOTAL 0.9 10/16/2019     OTHER:   Lab Results   Component Value Date    LACT 1.1 11/02/2015    NORIS 9.0 06/19/2022    LIPASE 409 (H) 11/02/2015       Anesthesia Plan    ASA Status:  3   NPO Status:  NPO Appropriate    Anesthesia Type: General.     - Airway: LMA   Induction: Intravenous, Propofol.   Maintenance: Balanced.        Consents    Anesthesia Plan(s) and associated risks, benefits, and realistic alternatives discussed. Questions answered and patient/representative(s) expressed understanding.    - Discussed:     - Discussed with:  Patient      - Extended Intubation/Ventilatory Support Discussed:  No.      - Patient is DNR/DNI Status: No    Use of blood products discussed: Yes.     - Discussed with: Patient.     - Consented: consented to blood products            Reason for refusal: other.     Postoperative Care    Pain management: IV analgesics, Oral pain medications, Multi-modal analgesia.   PONV prophylaxis: Ondansetron (or other 5HT-3), Dexamethasone or Solumedrol, Background Propofol Infusion     Comments:                Ivan Summers,

## 2022-07-07 NOTE — OR NURSING
"Contacted surgeon regarding pt's discharge medications because patient stated in recovery, \"He said he would give me a prescription for pain.\" Tylenol only prescription ordered. Dr. Chavez stated he had ordered Tylenol for pain and that patient needs to take his already prescribed Flomax and Oxybutynin to assist with the pain from the stent. Patient very upset and stated, \"I will just have to go to the emergency room for pain.\" Surgeon again contacted and stated he will write for some pills and will e-scribe to pt's pharmacy. Surgeon also gave VO for one dose of IV Toradol.  "

## 2022-07-07 NOTE — ANESTHESIA CARE TRANSFER NOTE
Patient: Jean Paul Gordon    Procedure: Procedure(s):  CYSTOSCOPY LEFT URETEROSCOPY, STONE BASKETING, WITH LEFT STENT EXCHANGE       Diagnosis: Ureteral stone [N20.1]  Diagnosis Additional Information: No value filed.    Anesthesia Type:   General     Note:    Oropharynx: oropharynx clear of all foreign objects and spontaneously breathing  Level of Consciousness: awake  Oxygen Supplementation: face mask  Level of Supplemental Oxygen (L/min / FiO2): 6  Independent Airway: airway patency satisfactory and stable  Dentition: dentition unchanged  Vital Signs Stable: post-procedure vital signs reviewed and stable  Report to RN Given: handoff report given  Patient transferred to: PACU    Handoff Report: Identifed the Patient, Identified the Reponsible Provider, Reviewed the pertinent medical history, Discussed the surgical course, Reviewed Intra-OP anesthesia mangement and issues during anesthesia, Set expectations for post-procedure period and Allowed opportunity for questions and acknowledgement of understanding      Vitals:  Vitals Value Taken Time   BP     Temp     Pulse     Resp     SpO2         Electronically Signed By: SANCHO Werner CRNA  July 7, 2022  2:40 PM

## 2022-07-07 NOTE — OP NOTE
OPERATIVE REPORT    PREOPERATIVE DIAGNOSIS:  Left ureteral stone    POSTOPERATIVE DIAGNOSIS: Same    PROCEDURES PERFORMED:   Cystourethroscopy  Left ureteroscopy  Left basket stone extraction  Left ureteral stent placement    STAFF SURGEON: Haroon Chavez MD  RESIDENT(S): Nima Farias MD    ANESTHESIA: General  ESTIMATED BLOOD LOSS: 2 ml  COMPLICATIONS: None.   SPECIMEN: Stone for analysis   URETERAL STENT(S):  - Left 6 x 26 cm double-J ureteral stent    SIGNIFICANT FINDINGS:   -Impacted distal 2-3 mm left ureteral stone   -Stone free with no fragments at end of case on the left side up to level of the proximal left ureter    BRIEF OPERATIVE INDICATIONS: Jean Paul Gordon is a(n) 72 year old male who presented with obstructing distal left ureteral stone. He underwent ureteral stent placement on 6/19/22.  After a discussion of all risks, benefits, and alternatives, the patient elected to proceed with definitive stone management. The patient understands the potential need for more than one procedure to eliminate all stone burden.      DESCRIPTION OF PROCEDURE:  After informed consent was obtained, the patient was transported to the operating room & placed supine on the table. After adequate anesthesia was induced, the patient was placed in lithotomy and prepped and draped in the usual sterile fashion. A timeout was taken to confirm correct patient, procedure and laterality. Pre-operative IV antibiotics were administered.     A 22-Italian rigid cystoscope was inserted into a well-lubricated urethra. The urethra was notable for a narrowing of about 20 fr within the proximal bulbar urethra which was gently dilated with the scope.     The existing left indwelling ureteral stent was identified and removed with a rigid stent grasper to the level of the urethral meatus. A Sensor wire was advanced up to the renal pelvis under fluoroscopic guidance and the previous stent was removed.      A semi-rigid ureteroscope was  introduced and advanced up the ureter alongside the wire.  An approximately 2-3 mm stone was identified in the distal ureter and appeared to be impacted into the left lateral wall of the ureter. The semi-rigid ureteroscope was advanced past the level of the stone up to the proximal left ureter and no additional stones or stone fragments were seen. The scope was backed down to the stone which was gently dislodged and removed intact with a tipless basket. The stone was passed off for stone analysis.    The rigid cystoscope was reintroduced and a 6 x 26 cm double-J stent was advanced over the Sensor wire with a good proximal curl seen in the renal pelvis on fluoroscopy and the distal curl was directly visualized in the bladder. A string was left attached to the stent.  The bladder was drained.    The patient tolerated the procedure well and there were no apparent complications. The patient  was transported to the postanesthesia care unit in stable condition.        POSTOP PLAN:  -Patient to remove stent at home via string on 7/11/22  -Follow up with Urology as outpatient to be scheduled in next 1-2 months to review results of stone analysis  -Repeat BMP ordered for next week and patient instructed to follow up with his PCP to review these results    Nima Farias MD  Urology, PGY-4  (p) [phone]

## 2022-07-07 NOTE — DISCHARGE INSTRUCTIONS
Same Day Surgery Discharge Instructions for  Sedation and General Anesthesia     It's not unusual to feel dizzy, light-headed or faint for up to 24 hours after surgery or while taking pain medication.  If you have these symptoms: sit for a few minutes before standing and have someone assist you when you get up to walk or use the bathroom.    You should rest and relax for the next 24 hours. We recommend you make arrangements to have an adult stay with you for at least 24 hours after your discharge.  Avoid hazardous and strenuous activity.    DO NOT DRIVE any vehicle or operate mechanical equipment for 24 hours following the end of your surgery.  Even though you may feel normal, your reactions may be affected by the medication you have received.    Do not drink alcoholic beverages for 24 hours following surgery.     Slowly progress to your regular diet as you feel able. It's not unusual to feel nauseated and/or vomit after receiving anesthesia.  If you develop these symptoms, drink clear liquids (apple juice, ginger ale, broth, 7-up, etc. ) until you feel better.  If your nausea and vomiting persists for 24 hours, please notify your surgeon.      All narcotic pain medications, along with inactivity and anesthesia, can cause constipation. Drinking plenty of liquids and increasing fiber intake will help.    For any questions of a medical nature, call your surgeon.    Do not make important decisions for 24 hours.    If you had general anesthesia, you may have a sore throat for a couple of days related to the breathing tube used during surgery.  You may use Cepacol lozenges to help with this discomfort.  If it worsens or if you develop a fever, contact your surgeon.     If you feel your pain is not well managed with the pain medications prescribed by your surgeon, please contact your surgeon's office to let them know so they can address your concerns.              Today you received Toradol, an antiinflammatory medication  similar to Ibuprofen.  You should not take other antiinflammatory medication, such as Ibuprofen, Motrin, Advil, Aleve, Naprosyn, etc until 11:00pm.

## 2022-07-07 NOTE — ANESTHESIA PROCEDURE NOTES
Airway       Patient location during procedure: OR ([facility] - Operating Room or Procedural Area)  Staff -        Anesthesiologist:  Ivan Summers DO       CRNA: Levy Shipley APRN CRNA       Performed By: CRNAIndications and Patient Condition       Indications for airway management: dayna-procedural       Induction type:intravenous       Mask difficulty assessment: 1 - vent by mask    Final Airway Details       Final airway type: supraglottic airway    Supraglottic Airway Details        Type: LMA       Brand: I-Gel       LMA size: 5    Post intubation assessment        Number of attempts at approach: 1       Number of other approaches attempted: 0       Ease of procedure: easy       Dentition: Intact and Unchanged

## 2022-07-07 NOTE — INTERVAL H&P NOTE
"I have reviewed the surgical (or preoperative) H&P that is linked to this encounter, and examined the patient. There are no significant changes    Clinical Conditions Present on Arrival:  Clinically Significant Risk Factors Present on Admission                   # Overweight: Estimated body mass index is 29.26 kg/m  as calculated from the following:    Height as of 6/19/22: 1.778 m (5' 10\").    Weight as of this encounter: 92.5 kg (203 lb 14.4 oz).       "

## 2022-07-08 ENCOUNTER — TELEPHONE (OUTPATIENT)
Dept: ONCOLOGY | Facility: CLINIC | Age: 72
End: 2022-07-08

## 2022-07-08 NOTE — TELEPHONE ENCOUNTER
"Called patient to follow up on previous conversation. Talked to his wife, Sandra, who stated that she was no longer with Jean Paul. However, she stated that he was not able to pull out the stent because he is too \"squeamish.\" She stated that she would not be able to do it either because she is also squeamish. They have no family in the area who would be able to assist. Discussed having him go to the ED to have it removed, but Sandra stated that would be a financial burden for them. Discussed having him come into clinic on Monday for assistance with removal, but Sandra stated she didn't know if he would be able to tolerate the pain over the weekend.    Sandra will have Jean Paul call back before 4:30 to discuss options.    Discussed options with Jean Paul. He will attempt to pull stent out. If he is unable to pull it out, and the pain becomes unbearable, he will go to the ED. If he is able to tolerate keeping the stent in over the weekend, he will call the clinic on Monday to have it removed in the clinic.    Melissa Swanson, RN  Triage Nurse Advisor  [facility]  "

## 2022-07-08 NOTE — TELEPHONE ENCOUNTER
[facility] Call Center    Phone Message    May a detailed message be left on voicemail: yes     Reason for Call: Medication Refill Request    Has the patient contacted the pharmacy for the refill? Yes   Name of medication being requested:oxyCODONE (ROXICODONE) 5 MG tablet     Provider who prescribed the medication: Dr. Chavez  Pharmacy: [id number] - [place], [place] - [address] AT [facility]   Date medication is needed: ASAP      Other: Patient had kidney stone surgery 7/7/22 and says he is still in excruciating pain. It hurts to urinate. And he is also concerned he might pass out if he pulls stent out on his own. Wondering if the pain will go away once the stent is removed. He is also concerned about it being infected. Please reach out to patient asap! Thank you!     Action Taken: Message routed to:  [facility] ([facility] Uro    Travel Screening: Not Applicable

## 2022-07-08 NOTE — PROVIDER NOTIFICATION
Pt having increasing pain with stent.  Pt is taking pain medicine.  Pt was encouraged to call the office and speak to the surgeon regarding more pain medicine.

## 2022-07-08 NOTE — TELEPHONE ENCOUNTER
Patient called to report uncontrolled pain. Had urology procedure with Dr. Chavez yesterday. Took his last oxycodone about an hour ago. Pain is rated about a 7/10 right now. Pain is worse when urinating. Patient is also taking ibuprofen, Flomax, and oxybutynin. The pain is a sharp/pressure and it is located in his penis. Patient is requesting more oxycodone. He is also concerned that the pain could be a sign of infection.    Paged Dr. Chavez for recommendations.    Per Dr. Chavez, patient should remove the stent today. He can take a dose of ibuprofen and then sit in the bath tub and pull it out. Patient should call back after removing to update us on his pain level.    Patient unsure if he will be able to remove it himself due to the pain, but he will try. He will call back once he removes it, or if he is unable to.    Melissa Swanson, RN  Triage Nurse Advisor  [facility]

## 2022-07-10 LAB
APPEARANCE STONE: NORMAL
COMPN STONE: NORMAL
SPECIMEN WT: 8 MG

## 2022-07-11 ENCOUNTER — ALLIED HEALTH/NURSE VISIT (OUTPATIENT)
Dept: UROLOGY | Facility: CLINIC | Age: 72
End: 2022-07-11
Payer: MEDICARE

## 2022-07-11 ENCOUNTER — TELEPHONE (OUTPATIENT)
Dept: ONCOLOGY | Facility: CLINIC | Age: 72
End: 2022-07-11

## 2022-07-11 DIAGNOSIS — Z79.2 PROPHYLACTIC ANTIBIOTIC: Primary | ICD-10-CM

## 2022-07-11 PROCEDURE — 99207 PR NO CHARGE NURSE ONLY: CPT

## 2022-07-11 RX ORDER — CIPROFLOXACIN 500 MG/1
500 TABLET, FILM COATED ORAL ONCE
Qty: 1 TABLET | Refills: 0 | Status: SHIPPED | OUTPATIENT
Start: 2022-07-11 | End: 2022-07-11

## 2022-07-11 NOTE — TELEPHONE ENCOUNTER
Called patient to follow up on conversation from Friday regarding pain and stent removal. Patient did not answer, and there was no option to leave a voicemail.    Melissa Swanson, RN  Triage Nurse Advisor  [facility]

## 2022-07-12 ENCOUNTER — TELEPHONE (OUTPATIENT)
Dept: UROLOGY | Facility: CLINIC | Age: 72
End: 2022-07-12

## 2022-07-12 NOTE — TELEPHONE ENCOUNTER
Unable to LVM for pt as voicemail box is not set-up. Writer sent pt a text message to follow-up with Mayra Jackson in early August to review stone analysis, per Dr. Chavez.    Macrina Bejarano MA  July 12, 2022

## 2022-08-04 NOTE — PROGRESS NOTES
Jean Paul Gordon comes into clinic today at the request of Dr. Chavez Ordering Provider for Stent on string removal.     Pt presents to clinic today for stent removal on string, string for stent was located and stent was removed with ease.  One antibiotic was sent to pt's preferred pharmacy pt was instructed to take today.      This service provided today was under the supervising provider of the day Lorna Hernandez PA-C, who was available if needed.    Blossom Whiteside, CMA

## 2023-05-11 ENCOUNTER — TELEPHONE (OUTPATIENT)
Dept: FAMILY MEDICINE | Facility: CLINIC | Age: 73
End: 2023-05-11
Payer: MEDICARE

## 2023-05-11 NOTE — TELEPHONE ENCOUNTER
Patient requesting 8 tablets of clonazepam. States he lost the medication and just needs an early refill.   Patient states he called his clinic - [facility], and they will not fill his medication so was trying to reach [facility] to have another provider prescribe.     Not a patient at [facility] and does not want to be seen here - [facility] - asked the  to send him there.     Routed call to [facility]     Carlene QUINN RN  [facility]

## (undated) DEVICE — PAD CHUX UNDERPAD 23X24" 7136

## (undated) DEVICE — GLOVE PROTEXIS POWDER FREE SMT 7.5  2D72PT75X

## (undated) DEVICE — RAD RX ISOVUE 300 (50ML) 61% IOPAMIDOL CHARGE PER ML

## (undated) DEVICE — BAG DRAIN URO FOR SIEMENS 8MM ADAPTER NS CC164NS-A

## (undated) DEVICE — TUBING IRRIG TUR Y TYPE 96" LF 6543-01

## (undated) DEVICE — SOL NACL 0.9% IRRIG 3000ML BAG 2B7477

## (undated) DEVICE — LINEN HALF SHEET 5512

## (undated) DEVICE — GLOVE PROTEXIS MICRO 7.5  2D73PM75

## (undated) DEVICE — SOL WATER IRRIG 1000ML BOTTLE 2F7114

## (undated) DEVICE — PREP SCRUB SOL EXIDINE 4% CHG 4OZ 29002-404

## (undated) DEVICE — CATH URETERAL FLEX TIP TIGERTAIL 06FRX70CM 139006

## (undated) DEVICE — BASKET NITINOL TIPLESS HALO  1.5FRX120CM 554120

## (undated) DEVICE — BAG CLEAR TRASH 1.3M 39X33" P4040C

## (undated) DEVICE — GUIDEWIRE SENSOR DUAL FLEX STR 0.035"X150CM M0066703080

## (undated) DEVICE — PACK CYSTOSCOPY SBA15CYFSI

## (undated) DEVICE — PACK CYSTO CUSTOM RIDGES

## (undated) RX ORDER — KETOROLAC TROMETHAMINE 15 MG/ML
INJECTION, SOLUTION INTRAMUSCULAR; INTRAVENOUS
Status: DISPENSED
Start: 2022-07-07

## (undated) RX ORDER — LIDOCAINE HYDROCHLORIDE 20 MG/ML
JELLY TOPICAL
Status: DISPENSED
Start: 2022-07-07

## (undated) RX ORDER — KETOROLAC TROMETHAMINE 30 MG/ML
INJECTION, SOLUTION INTRAMUSCULAR; INTRAVENOUS
Status: DISPENSED
Start: 2022-06-19

## (undated) RX ORDER — LIDOCAINE HYDROCHLORIDE 10 MG/ML
INJECTION, SOLUTION EPIDURAL; INFILTRATION; INTRACAUDAL; PERINEURAL
Status: DISPENSED
Start: 2022-06-19

## (undated) RX ORDER — LIDOCAINE HYDROCHLORIDE 20 MG/ML
JELLY TOPICAL
Status: DISPENSED
Start: 2022-06-19

## (undated) RX ORDER — ONDANSETRON 2 MG/ML
INJECTION INTRAMUSCULAR; INTRAVENOUS
Status: DISPENSED
Start: 2022-06-19

## (undated) RX ORDER — FENTANYL CITRATE 0.05 MG/ML
INJECTION, SOLUTION INTRAMUSCULAR; INTRAVENOUS
Status: DISPENSED
Start: 2022-07-07

## (undated) RX ORDER — FENTANYL CITRATE 50 UG/ML
INJECTION, SOLUTION INTRAMUSCULAR; INTRAVENOUS
Status: DISPENSED
Start: 2022-06-19

## (undated) RX ORDER — FENTANYL CITRATE 50 UG/ML
INJECTION, SOLUTION INTRAMUSCULAR; INTRAVENOUS
Status: DISPENSED
Start: 2022-07-07

## (undated) RX ORDER — GLYCOPYRROLATE 0.2 MG/ML
INJECTION, SOLUTION INTRAMUSCULAR; INTRAVENOUS
Status: DISPENSED
Start: 2022-07-07

## (undated) RX ORDER — DEXAMETHASONE SODIUM PHOSPHATE 4 MG/ML
INJECTION, SOLUTION INTRA-ARTICULAR; INTRALESIONAL; INTRAMUSCULAR; INTRAVENOUS; SOFT TISSUE
Status: DISPENSED
Start: 2022-06-19

## (undated) RX ORDER — OXYCODONE HYDROCHLORIDE 5 MG/1
TABLET ORAL
Status: DISPENSED
Start: 2022-07-07

## (undated) RX ORDER — CEFAZOLIN SODIUM/WATER 2 G/20 ML
SYRINGE (ML) INTRAVENOUS
Status: DISPENSED
Start: 2022-07-07